# Patient Record
Sex: MALE | Race: WHITE | NOT HISPANIC OR LATINO | Employment: UNEMPLOYED | URBAN - METROPOLITAN AREA
[De-identification: names, ages, dates, MRNs, and addresses within clinical notes are randomized per-mention and may not be internally consistent; named-entity substitution may affect disease eponyms.]

---

## 2017-10-18 ENCOUNTER — OFFICE VISIT (OUTPATIENT)
Dept: MEDICAL GROUP | Facility: LAB | Age: 32
End: 2017-10-18
Payer: COMMERCIAL

## 2017-10-18 ENCOUNTER — HOSPITAL ENCOUNTER (OUTPATIENT)
Dept: LAB | Facility: MEDICAL CENTER | Age: 32
End: 2017-10-18
Attending: INTERNAL MEDICINE
Payer: COMMERCIAL

## 2017-10-18 VITALS
WEIGHT: 200.6 LBS | HEIGHT: 73 IN | BODY MASS INDEX: 26.59 KG/M2 | OXYGEN SATURATION: 95 % | RESPIRATION RATE: 12 BRPM | TEMPERATURE: 98.2 F | SYSTOLIC BLOOD PRESSURE: 102 MMHG | HEART RATE: 74 BPM | DIASTOLIC BLOOD PRESSURE: 62 MMHG

## 2017-10-18 DIAGNOSIS — Z00.00 ANNUAL PHYSICAL EXAM: ICD-10-CM

## 2017-10-18 DIAGNOSIS — F29 PSYCHOTIC DISORDER WITH DELUSIONS (HCC): Primary | ICD-10-CM

## 2017-10-18 LAB
25(OH)D3 SERPL-MCNC: 31 NG/ML (ref 30–100)
ALBUMIN SERPL BCP-MCNC: 4.9 G/DL (ref 3.2–4.9)
ALBUMIN/GLOB SERPL: 1.5 G/DL
ALP SERPL-CCNC: 59 U/L (ref 30–99)
ALT SERPL-CCNC: 19 U/L (ref 2–50)
ANION GAP SERPL CALC-SCNC: 10 MMOL/L (ref 0–11.9)
AST SERPL-CCNC: 21 U/L (ref 12–45)
BASOPHILS # BLD AUTO: 0.7 % (ref 0–1.8)
BASOPHILS # BLD: 0.05 K/UL (ref 0–0.12)
BILIRUB SERPL-MCNC: 0.8 MG/DL (ref 0.1–1.5)
BUN SERPL-MCNC: 13 MG/DL (ref 8–22)
CALCIUM SERPL-MCNC: 10.2 MG/DL (ref 8.5–10.5)
CHLORIDE SERPL-SCNC: 103 MMOL/L (ref 96–112)
CHOLEST SERPL-MCNC: 169 MG/DL (ref 100–199)
CO2 SERPL-SCNC: 26 MMOL/L (ref 20–33)
CREAT SERPL-MCNC: 1 MG/DL (ref 0.5–1.4)
EOSINOPHIL # BLD AUTO: 0.11 K/UL (ref 0–0.51)
EOSINOPHIL NFR BLD: 1.5 % (ref 0–6.9)
ERYTHROCYTE [DISTWIDTH] IN BLOOD BY AUTOMATED COUNT: 39.4 FL (ref 35.9–50)
GFR SERPL CREATININE-BSD FRML MDRD: >60 ML/MIN/1.73 M 2
GLOBULIN SER CALC-MCNC: 3.2 G/DL (ref 1.9–3.5)
GLUCOSE SERPL-MCNC: 88 MG/DL (ref 65–99)
HCT VFR BLD AUTO: 45.9 % (ref 42–52)
HDLC SERPL-MCNC: 45 MG/DL
HGB BLD-MCNC: 15.8 G/DL (ref 14–18)
IMM GRANULOCYTES # BLD AUTO: 0.01 K/UL (ref 0–0.11)
IMM GRANULOCYTES NFR BLD AUTO: 0.1 % (ref 0–0.9)
LDLC SERPL CALC-MCNC: 104 MG/DL
LYMPHOCYTES # BLD AUTO: 2.33 K/UL (ref 1–4.8)
LYMPHOCYTES NFR BLD: 31.7 % (ref 22–41)
MCH RBC QN AUTO: 29.8 PG (ref 27–33)
MCHC RBC AUTO-ENTMCNC: 34.4 G/DL (ref 33.7–35.3)
MCV RBC AUTO: 86.4 FL (ref 81.4–97.8)
MONOCYTES # BLD AUTO: 0.49 K/UL (ref 0–0.85)
MONOCYTES NFR BLD AUTO: 6.7 % (ref 0–13.4)
NEUTROPHILS # BLD AUTO: 4.35 K/UL (ref 1.82–7.42)
NEUTROPHILS NFR BLD: 59.3 % (ref 44–72)
NRBC # BLD AUTO: 0 K/UL
NRBC BLD AUTO-RTO: 0 /100 WBC
PLATELET # BLD AUTO: 231 K/UL (ref 164–446)
PMV BLD AUTO: 11.6 FL (ref 9–12.9)
POTASSIUM SERPL-SCNC: 4.1 MMOL/L (ref 3.6–5.5)
PROT SERPL-MCNC: 8.1 G/DL (ref 6–8.2)
RBC # BLD AUTO: 5.31 M/UL (ref 4.7–6.1)
SODIUM SERPL-SCNC: 139 MMOL/L (ref 135–145)
TRIGL SERPL-MCNC: 101 MG/DL (ref 0–149)
WBC # BLD AUTO: 7.3 K/UL (ref 4.8–10.8)

## 2017-10-18 PROCEDURE — 80053 COMPREHEN METABOLIC PANEL: CPT

## 2017-10-18 PROCEDURE — 80061 LIPID PANEL: CPT

## 2017-10-18 PROCEDURE — 82306 VITAMIN D 25 HYDROXY: CPT

## 2017-10-18 PROCEDURE — 36415 COLL VENOUS BLD VENIPUNCTURE: CPT

## 2017-10-18 PROCEDURE — 85025 COMPLETE CBC W/AUTO DIFF WBC: CPT

## 2017-10-18 PROCEDURE — 99204 OFFICE O/P NEW MOD 45 MIN: CPT | Performed by: INTERNAL MEDICINE

## 2017-10-18 RX ORDER — ZOLPIDEM TARTRATE 10 MG/1
10 TABLET ORAL NIGHTLY PRN
Qty: 10 TAB | Refills: 0 | Status: SHIPPED | OUTPATIENT
Start: 2017-10-18 | End: 2017-10-28

## 2017-10-18 ASSESSMENT — PATIENT HEALTH QUESTIONNAIRE - PHQ9
SUM OF ALL RESPONSES TO PHQ QUESTIONS 1-9: 19
CLINICAL INTERPRETATION OF PHQ2 SCORE: 2
5. POOR APPETITE OR OVEREATING: 2 - MORE THAN HALF THE DAYS

## 2017-10-18 NOTE — ASSESSMENT & PLAN NOTE
Patient does not have a primary care provider in the past. He does not have any major medical problems that he knows of. He does have a history of hypothyroidism running in his family.  I will go ahead and order some basic labs for him today including CBC, CMP, vitamin D, lipid panel, and TSH.  He declined all immunizations at this time. Will discuss more in his follow-up visit in 4 weeks.
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
## 2017-10-18 NOTE — PATIENT INSTRUCTIONS
Psychosis  Psychosis refers to a severe loss of contact with reality. During a psychotic episode, a person is not able to think clearly, and his or her emotions and responses do not match up with what is actually happening. Someone may have false beliefs about what is happening or who they are (delusions). Someone may see, hear, taste, smell, or feel things that are not present (hallucinations).   Psychosis usually occurs with very serious mental health (psychiatric) conditions such as schizophrenia, bipolar disorder, or major depression. It can sometimes also be the result of drug use or certain medical conditions.  SYMPTOMS  Symptoms of a psychotic episode include:  · Delusions, such as:  ¨ Feeling excessive fear or suspicion (paranoia).  ¨ Believing something that is odd, unrealistic, or false, such as having a false belief about being someone else.  · Hallucinations.  · Disorganized thinking, such as thoughts that jump from one to another that do not make sense to others.  · Disorganized speech, such as saying things that do not make sense to others.  · Inappropriate behavior, such as talking to oneself or intruding on unfamiliar people.  DIAGNOSIS  A diagnosis of psychosis is made through an assessment by a health care provider, who will ask questions about thoughts, feelings, behavior, drug use, and medical conditions. The health care provider may also do one or more of the following:  · Physical exam.  · Blood tests.  · Brain imaging, such as a CT scan or MRI.  · Brain wave study (EEG).  The health care provider may make a referral for further evaluation by a mental health professional.  TREATMENT   Treatment depends on the cause of the psychosis. Treatment may include one or more of the following:  · Monitoring and supportive care in the emergency room or hospital.    · Taking medicines (antipsychotic medicine) to reduce symptoms and to balance chemicals in the brain.  · Treating an underlying medical  condition.  · Stopping or reducing drugs that are causing psychosis.  · Therapy and other supportive programs outside of the hospital.  HOME CARE INSTRUCTIONS  · Over-the-counter and prescription medicines should be taken only as told by the health care provider.  · The health care provider should be consulted before over-the-counter medicines, herbs, or supplements are used.  · All follow-up visits should be kept as told by the health care provider. This is important.  · A healthy lifestyle should be maintained. This includes:  ¨ Eating a healthy diet.  ¨ Getting enough sleep.  ¨ Exercising regularly.  ¨ Avoiding alcohol and recreational drugs as told by the health care provider.  SEEK MEDICAL CARE IF:  · Medicines do not seem to be helping.  · The person hears voices telling him or her to do things.  · The person continues to see, smell, or feel things that are not there.  · The person feels extremely fearful and suspicious that someone or something will harm him or her.  · The person feels unable to leave his or her house.  · The person has trouble taking care of himself or herself.  · The person experiences side effects of medicines, such as:  ¨ Changes in sleep patterns.  ¨ Dizziness.  ¨ Weight gain.  ¨ Restlessness.  ¨ Movement changes.  ¨ Muscle spasms.  ¨ Tremors.  SEEK IMMEDIATE MEDICAL CARE IF:  · Serious thoughts occur about self-harm or about hurting others.  · There are serious side effects of medicine, such as:  ¨ Swelling of the face, lips, tongue, or throat.  ¨ Fever, confusion, muscle spasms, or seizures.     This information is not intended to replace advice given to you by your health care provider. Make sure you discuss any questions you have with your health care provider.     Document Released: 06/07/2011 Document Revised: 05/03/2016 Document Reviewed: 12/22/2015  [employer] Interactive Patient Education ©2016 [employer]
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
## 2017-10-20 ENCOUNTER — TELEPHONE (OUTPATIENT)
Dept: MEDICAL GROUP | Facility: LAB | Age: 32
End: 2017-10-20

## 2017-10-20 NOTE — TELEPHONE ENCOUNTER
Barbara called - she said the very first form of the [place] paperwork needed to be signed by you was missed. She is going to fax the forms over to us, if you can please sign as soon as possible. Barbara will provide a number to fax the forms to.

## 2017-10-20 NOTE — TELEPHONE ENCOUNTER
"----- Message from Katie Ramos M.D. sent at 10/20/2017  9:04 AM PDT -----  Denita Rodriguez,  I reviewed the results and your blood count, chemistry, vitamin D, liver and kidney function all within normal limits.  Your LDL \"Bad cholesterol\" is slightly above target. I will recommend that you adopt a healthy life style with healthy diet and regular exercise once everything settle down with your acute psychotic episode and I will be re-checking it about 6 months later.  I did fill out your FMLA form on Thursday and left it upfront by the end of the day.  You or your wife could stop by to pick it up anytime during business hours.     Thank you,  Katie Ramos M.D.  "

## 2017-10-20 NOTE — TELEPHONE ENCOUNTER
Patient's spouse was notified of lab results. Barbara wanted to know her 's Thyroid results - no results at this time. It looks like the patient was never drawn for TSH WITH REFLEX TO FT4 . I called the [id number] to see if they still have the patient's blood and if they can still do the TSH that was ordered? They said the patient didn't present at the time of service the the TSH order, it is too late to add the test on the patient will need to be re-drawn.

## 2017-10-20 NOTE — TELEPHONE ENCOUNTER
I spoke to Barbara that the forms are ready for . I let her know that a few of the fields were left blank for her to fill in. She will  the forms today.

## 2017-10-23 ENCOUNTER — TELEPHONE (OUTPATIENT)
Dept: MEDICAL GROUP | Facility: LAB | Age: 32
End: 2017-10-23

## 2017-10-23 ENCOUNTER — HOSPITAL ENCOUNTER (OUTPATIENT)
Dept: LAB | Facility: MEDICAL CENTER | Age: 32
End: 2017-10-23
Attending: INTERNAL MEDICINE
Payer: COMMERCIAL

## 2017-10-23 LAB — TSH SERPL DL<=0.005 MIU/L-ACNC: 1.07 UIU/ML (ref 0.3–3.7)

## 2017-10-23 PROCEDURE — 84443 ASSAY THYROID STIM HORMONE: CPT

## 2017-10-23 PROCEDURE — 36415 COLL VENOUS BLD VENIPUNCTURE: CPT

## 2017-10-23 NOTE — TELEPHONE ENCOUNTER
Patient's wife spoke with my MA today that she would like him to be referred to neurology thinking that his most recent psych episode is related to a neuro disorder.  Patient has had a pure psychotic breakout, his CT head from the other hospital was completely normal. He had no seizure. His full neuro exam was normal.   I don't think he will require a neurology referral at this time.  I prefer that patient meet with psychiatry and behavioral health first and I will hold off on neuro referral for now.

## 2017-10-25 NOTE — TELEPHONE ENCOUNTER
Patient notified no Neurology referral was submitted. Patient is ok with this at this time. Will follow up with behavioral health first. Thyroid results were also given to patient.

## 2017-10-26 DIAGNOSIS — F29 PSYCHOTIC DISORDER WITH DELUSIONS (HCC): ICD-10-CM

## 2017-10-26 NOTE — TELEPHONE ENCOUNTER
Was the patient seen in the last year in this department? Yes     Does patient have an active prescription for medications requested? No     Received Request Via: Patient     Last visit:10/18/17    Last  fill:10/19/17    Patient has an appointment Tuesday with a psychiatrist but patient will run out of Ambien before hand, patient would like a refill till he is seen. Patient would also like to try Ambien CR as he wakes up around 3 AM and can not fall back asleep.

## 2017-10-30 ENCOUNTER — TELEPHONE (OUTPATIENT)
Dept: MEDICAL GROUP | Facility: LAB | Age: 32
End: 2017-10-30

## 2017-10-30 RX ORDER — ZOLPIDEM TARTRATE 10 MG/1
10 TABLET ORAL NIGHTLY PRN
Qty: 10 TAB | Refills: 0
Start: 2017-10-30 | End: 2017-11-09

## 2017-10-30 NOTE — TELEPHONE ENCOUNTER
Liseth, please call patient to see if he got to see psych and behavioral yet since the referral was urgent.  Please let him know that Ambien can't be filled electronically. We need to get psych to see him soon otherwise I need to see him in the office for [employer] and a controlled substance contract before we can give him more benzo.

## 2017-10-30 NOTE — TELEPHONE ENCOUNTER
I spoke to Barbara (pt's spouse) he has his first psych appointment tomorrow. She thinks he should be able to get his ambien refilled at his appointment tomorrow. If not, she knows he will need an appointment with Dr. Ramos and to do a urine drug test here in the office.

## 2017-11-10 ENCOUNTER — TELEPHONE (OUTPATIENT)
Dept: MEDICAL GROUP | Facility: LAB | Age: 32
End: 2017-11-10

## 2017-11-10 NOTE — TELEPHONE ENCOUNTER
ESTABLISHED PATIENT PRE-VISIT PLANNING     Note: Patient will not be contacted if there is no indication to call.     1.  Reviewed notes from the last few office visits within the medical group: Yes    2.  If any orders were placed at last visit or intended to be done for this visit (i.e. 6 mos follow-up), do we have Results/Consult Notes?        •  Labs - Labs ordered, completed on 10/23/17 and results are in chart.   Note: If patient appointment is for lab review and patient did not complete labs, check with provider if OK to reschedule patient until labs completed.       •  Imaging - Imaging was not ordered at last office visit.       •  Referrals - No referrals were ordered at last office visit.    3. Is this appointment scheduled as a Hospital Follow-Up? No    4.  Immunizations were updated in [employer] using [employer]?: Yes       •  Web [employer] Recommendations: FLU    5.  Patient is due for the following Health Maintenance Topics:   Health Maintenance Due   Topic Date Due   • IMM INFLUENZA (1) 09/01/2017           6.  Patient was NOT informed to arrive 15 min prior to their scheduled appointment and bring in their medication bottles.

## 2017-11-13 ENCOUNTER — OFFICE VISIT (OUTPATIENT)
Dept: MEDICAL GROUP | Facility: LAB | Age: 32
End: 2017-11-13
Payer: COMMERCIAL

## 2017-11-13 VITALS
WEIGHT: 207.8 LBS | TEMPERATURE: 97 F | HEART RATE: 80 BPM | HEIGHT: 73 IN | OXYGEN SATURATION: 98 % | BODY MASS INDEX: 27.54 KG/M2 | SYSTOLIC BLOOD PRESSURE: 116 MMHG | RESPIRATION RATE: 16 BRPM | DIASTOLIC BLOOD PRESSURE: 82 MMHG

## 2017-11-13 DIAGNOSIS — F99 INSOMNIA DUE TO OTHER MENTAL DISORDER: ICD-10-CM

## 2017-11-13 DIAGNOSIS — Z23 NEED FOR VACCINATION: ICD-10-CM

## 2017-11-13 DIAGNOSIS — F29 PSYCHOTIC DISORDER WITH DELUSIONS (HCC): ICD-10-CM

## 2017-11-13 DIAGNOSIS — F51.05 INSOMNIA DUE TO OTHER MENTAL DISORDER: ICD-10-CM

## 2017-11-13 PROCEDURE — 99214 OFFICE O/P EST MOD 30 MIN: CPT | Mod: 25 | Performed by: INTERNAL MEDICINE

## 2017-11-13 PROCEDURE — 90686 IIV4 VACC NO PRSV 0.5 ML IM: CPT | Performed by: INTERNAL MEDICINE

## 2017-11-13 PROCEDURE — 90471 IMMUNIZATION ADMIN: CPT | Performed by: INTERNAL MEDICINE

## 2017-11-13 RX ORDER — ZOLPIDEM TARTRATE 12.5 MG/1
12.5 TABLET, FILM COATED, EXTENDED RELEASE ORAL NIGHTLY PRN
Qty: 15 TAB | Refills: 1 | Status: SHIPPED | OUTPATIENT
Start: 2017-11-13 | End: 2017-12-13

## 2017-11-13 RX ORDER — QUETIAPINE FUMARATE 25 MG/1
25 TABLET, FILM COATED ORAL
COMMUNITY
End: 2020-11-06

## 2017-11-13 NOTE — ASSESSMENT & PLAN NOTE
This is a chronic controlled problem.   Patient is started to have difficulty falling asleep and staying asleep since his acute psychotic episode about 2 months ago. He was using some Ambien that I prescribed him last time about 3 times a week. He stated that, it does help him with the sleep and he would like to get a refill.  He was able to meet with a psychologist for counseling sessions but unfortunately he stated that his insurance does not cover psychiatric.  We discussed that I will continue to prescribe him Ambien 12.5 mg slow release for #15 pills per month and will slowly taper him off.  We'll do a [employer] screen today.

## 2017-11-13 NOTE — PROGRESS NOTES
Chief Complaint   Patient presents with   • Anxiety     follow up       Subjective:     HPI:   Michael presents today to follow-up on his brief psychotic episode.    Psychotic disorder with delusions  This is a chronic controlled problem.   Patient is here for follow-up today for his acute psychotic episode that he had about 2 months ago during vacation.  He continued to be on Seroquel 25 mg daily. He is established with a psychologist with [facility] about couple of weeks ago. So far, he had a couple of counseling sessions with great help.   Today, patient stated that he is feeling 100% better. He has no further episodes of disorientation, delusions, mood swings or hallucinations. 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  Denied any suicidal ideations.  His going back to work on Monday.      Insomnia due to other mental disorder  This is a chronic controlled problem.   Patient is started to have difficulty falling asleep and staying asleep since his acute psychotic episode about 2 months ago. He was using some Ambien that I prescribed him last time about 3 times a week. He stated that, it does help him with the sleep and he would like to get a refill.  He was able to meet with a psychologist for counseling sessions but unfortunately he stated that his insurance does not cover psychiatric.  We discussed that I will continue to prescribe him Ambien 12.5 mg slow release for #15 pills per month and will slowly taper him off.  We'll do a [facility] screen today.    Need for vaccination  Discussed the pros and cons of getting a flu Shot. Patient Finally Agreed to Proceed with Influenza Vaccination Today.      Patient Active Problem List    Diagnosis Date Noted   • Insomnia due to other mental disorder 11/13/2017   • Need for vaccination 11/13/2017   • Psychotic disorder with delusions 10/18/2017   • Annual  physical exam 10/18/2017       Current Outpatient Prescriptions   Medication Sig Dispense Refill   • zolpidem (AMBIEN CR) 12.5 MG CR tablet Take 1 Tab by mouth at bedtime as needed for Sleep for up to 30 days. 15 Tab 1   • quetiapine (SEROQUEL) 25 MG Tab Take 25 mg by mouth every bedtime.       No current facility-administered medications for this visit.        Allergies as of 11/13/2017 - Reviewed 10/18/2017   Allergen Reaction Noted   • Prednisone Rash 06/13/2011   • Prednisone  03/16/2013        Past Medical History:   Diagnosis Date   • Bipolar affect, depressed (CMS-HCC)    • Insomnia due to other mental disorder 11/13/2017       History reviewed. No pertinent surgical history.    Social History   Substance Use Topics   • Smoking status: Never Smoker   • Smokeless tobacco: Never Used   • Alcohol use 1.8 oz/week     3 Shots of liquor per week      Comment: occ       Family History   Problem Relation Age of Onset   • No Known Problems Mother    • No Known Problems Brother    • Prostate cancer Paternal Grandfather    • Thyroid Paternal Uncle        ROS:     - Constitutional: Negative for fever, chills, unexpected weight change, and fatigue/generalized weakness.     - HEENT: Negative for headaches, vision changes, hearing changes, ear pain, ear discharge, sinus congestion, or sore throat.     - Respiratory: Negative for cough, sputum production, dyspnea and wheezing.    - Cardiovascular: Negative for chest pain or palpitations.      - Gastrointestinal: Negative for heartburn, nausea, vomiting, abdominal pain, diarrhea or constipation.     - Genitourinary: Negative for dysuria, polyuria or urinary urgency.    - Musculoskeletal: Negative for myalgias, back pain, and joint pain.     - Skin: Negative for rash, itching, cyanotic skin color change.     - Psychiatric/Behavioral: Negative for depression or suicidal/homicidal ideation.       Physical Exam:     Blood pressure 116/82, pulse 80, temperature 36.1 °C (97 °F),  "resp. rate 16, height 1.854 m (6' 1\"), weight 94.3 kg (207 lb 12.8 oz), SpO2 98 %. Body mass index is 27.42 kg/m².   Gen:         Alert and oriented, No apparent distress.  Neck:        No Lymphadenopathy or Bruits.  Lungs:     Clear to auscultation bilaterally  CV:          Regular rate and rhythm. No murmurs, rubs or gallops.               Ext:          No clubbing, cyanosis, edema.       Assessment and Plan:     32 y.o. male with the following issues.    1. Psychotic disorder with delusions  This is a chronic controlled problem.   Has been on Seroquel 25 mg daily at bedtime. Since patient only had any acute psychotic episodes about 10 years ago and again 2 months ago, I think their frequency of his flareups are not enough to keep him on a long-term maintenance therapy. I will aim to completely wean him off Seroquel as soon as is feasible.  Suggested that he start taking half a pill of Seroquel 25 mg every night or take one pill every other night for now.  I will see him again in one month to reevaluate him and consider stopping medications. Continue to see psychology for counseling sessions.    2. Insomnia due to other mental disorder  This is a chronic uncontrolled problem.   This problem has been triggered by his recent psychotic episode. He is still requiring sleep aids couple of times a week. I will prescribe him Ambien as below for #15 pills for the month. Will reevaluate next visit.  We'll get a [facility] screen as well.  - zolpidem (AMBIEN CR) 12.5 MG CR tablet; Take 1 Tab by mouth at bedtime as needed for Sleep for up to 30 days.  Dispense: 15 Tab; Refill: 1 [facility] SCREEN; Future    3. Need for vaccination  Urine today.  - INFLUENZA VACCINE QUAD INJ >3Y(PF)           Health Maintenance:   Completed    Health Maintenance Due   Topic   • IMM INFLUENZA (1)Given today.        Follow Up:      Return in about 4 weeks (around 12/11/2017) for FU Psych meds. .      Please note that this " dictation was created using voice recognition software. I have made every reasonable attempt to correct obvious errors, but I expect that there are errors of grammar and possibly content that I did not discover before finalizing the note.    Signed by: Katie Ramos M.D.

## 2017-11-13 NOTE — ASSESSMENT & PLAN NOTE
This is a chronic controlled problem.   Patient is here for follow-up today for his acute psychotic episode that he had about 2 months ago during vacation.  He continued to be on Seroquel 25 mg daily. He is established with a psychologist with [facility] about couple of weeks ago. So far, he had a couple of counseling sessions with great help.   Today, patient stated that he is feeling 100% better. He has no further episodes of disorientation, delusions, mood swings or hallucinations. 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  Denied any suicidal ideations.  His going back to work on Monday.

## 2017-11-13 NOTE — LETTER
November 13, 2017    To Whom It May Concern:         This is confirmation that Michael Burdick attended his scheduled appointment with Katie Ramos M.D. on 11/13/17.       He can resume his normal work duties and schedule.          If you have any questions please do not hesitate to call me at the phone number listed below.    Sincerely,          Katie Ramos M.D.  [phone]

## 2017-11-13 NOTE — ASSESSMENT & PLAN NOTE
Discussed the pros and cons of getting a flu Shot. Patient Finally Agreed to Proceed with Influenza Vaccination Today.

## 2019-10-08 ENCOUNTER — IMMUNIZATION (OUTPATIENT)
Dept: SOCIAL WORK | Facility: CLINIC | Age: 34
End: 2019-10-08

## 2019-10-08 DIAGNOSIS — Z23 NEED FOR VACCINATION: ICD-10-CM

## 2019-10-08 PROCEDURE — 90686 IIV4 VACC NO PRSV 0.5 ML IM: CPT | Performed by: REGISTERED NURSE

## 2020-11-05 ENCOUNTER — TELEMEDICINE (OUTPATIENT)
Dept: MEDICAL GROUP | Facility: PHYSICIAN GROUP | Age: 35
End: 2020-11-05
Payer: COMMERCIAL

## 2020-11-05 VITALS — TEMPERATURE: 102 F | WEIGHT: 194 LBS | HEIGHT: 74 IN | BODY MASS INDEX: 24.9 KG/M2

## 2020-11-05 DIAGNOSIS — R52 BODY ACHES: ICD-10-CM

## 2020-11-05 DIAGNOSIS — R50.81 FEVER IN OTHER DISEASES: ICD-10-CM

## 2020-11-05 DIAGNOSIS — R05.9 COUGH: ICD-10-CM

## 2020-11-05 DIAGNOSIS — R06.02 SHORTNESS OF BREATH: ICD-10-CM

## 2020-11-05 PROCEDURE — 99214 OFFICE O/P EST MOD 30 MIN: CPT | Performed by: NURSE PRACTITIONER

## 2020-11-05 RX ORDER — AMOXICILLIN AND CLAVULANATE POTASSIUM 875; 125 MG/1; MG/1
1 TABLET, FILM COATED ORAL 2 TIMES DAILY
Qty: 10 TAB | Refills: 0 | Status: SHIPPED | OUTPATIENT
Start: 2020-11-05 | End: 2021-10-01

## 2020-11-05 NOTE — PROGRESS NOTES
Virtual Visit: Established Patient   This visit was conducted via Zoom using secure and encrypted videoconferencing technology. The patient was in a private location in the state of [place].    The patient's identity was confirmed and verbal consent was obtained for this virtual visit.    Subjective:   CC:   Chief Complaint   Patient presents with   • Shortness of Breath       Michael Thissen is a 35 y.o. male presenting for evaluation and management of:    Cough  Patient symptoms started on Sunday with cough and body aches  He also had one episode of vomiting  Concern for Covid and would like testing, this has been ordered  He also complains of sinus pressure and pain, cough previously productive of clear phlegm and is now attending dark yellow and green  Discussed differentials to include seasonal allergies versus acute sinusitis versus bronchitis versus Covid versus other unknown upper respiratory infection  We will empirically treat for sinusitis as symptoms are consistent  He was advised to continue with self-isolation, avoid working until he can get the results of his Covid test  ER precautions should his symptoms become severe        ROS   Positive for SOB,     Allergies   Allergen Reactions   • Prednisone Rash   • Prednisone        Current medicines (including changes today)  Current Outpatient Medications   Medication Sig Dispense Refill   • amoxicillin-clavulanate (AUGMENTIN) 875-125 MG Tab Take 1 Tab by mouth 2 times a day. 10 Tab 0   • quetiapine (SEROQUEL) 25 MG Tab Take 25 mg by mouth every bedtime.       No current facility-administered medications for this visit.        Patient Active Problem List    Diagnosis Date Noted   • Fever 11/06/2020   • Body aches 11/06/2020   • Cough 11/06/2020   • Shortness of breath 11/06/2020   • Insomnia due to other mental disorder 11/13/2017   • Need for vaccination 11/13/2017   • Psychotic disorder with delusions (HCC) 10/18/2017   • Annual physical exam 10/18/2017  "      Family History   Problem Relation Age of Onset   • No Known Problems Mother    • No Known Problems Brother    • Prostate cancer Paternal Grandfather    • Thyroid Paternal Uncle        He  has a past medical history of Bipolar affect, depressed (CMS-HCC) (HCC) and Insomnia due to other mental disorder (11/13/2017).  He  has no past surgical history on file.       Objective:   Temp (!) 38.9 °C (102 °F) (Oral)   Ht 1.88 m (6' 2\")   Wt 88 kg (194 lb)   BMI 24.91 kg/m²     Physical Exam:  Constitutional: Alert, no distress, well-groomed.  Skin: No rashes in visible areas.  Eye: Round. Conjunctiva clear, lids normal. No icterus.   ENMT: Lips pink without lesions, good dentition, moist mucous membranes. Phonation normal.  Neck: No masses, no thyromegaly. Moves freely without pain.  Respiratory: Unlabored respiratory effort, no cough or audible wheeze  Psych: Alert and oriented x3, normal affect and mood.       Assessment and Plan:   The following treatment plan was discussed:     1. Fever in other diseases  - COVID/SARS COV-2 PCR; Future    2. Body aches  - COVID/SARS COV-2 PCR; Future    3. Cough  - COVID/SARS COV-2 PCR; Future    4. Shortness of breath  - COVID/SARS COV-2 PCR; Future    Other orders  - amoxicillin-clavulanate (AUGMENTIN) 875-125 MG Tab; Take 1 Tab by mouth 2 times a day.  Dispense: 10 Tab; Refill: 0        Follow-up: No follow-ups on file.         "

## 2020-11-06 ENCOUNTER — HOSPITAL ENCOUNTER (OUTPATIENT)
Dept: LAB | Facility: MEDICAL CENTER | Age: 35
End: 2020-11-06
Attending: NURSE PRACTITIONER
Payer: COMMERCIAL

## 2020-11-06 PROBLEM — R05.9 COUGH: Status: ACTIVE | Noted: 2020-11-06

## 2020-11-06 PROBLEM — R52 BODY ACHES: Status: ACTIVE | Noted: 2020-11-06

## 2020-11-06 PROBLEM — R06.02 SHORTNESS OF BREATH: Status: ACTIVE | Noted: 2020-11-06

## 2020-11-06 PROBLEM — R50.9 FEVER: Status: ACTIVE | Noted: 2020-11-06

## 2020-11-06 LAB — COVID ORDER STATUS COVID19: NORMAL

## 2020-11-06 PROCEDURE — C9803 HOPD COVID-19 SPEC COLLECT: HCPCS

## 2020-11-06 PROCEDURE — U0003 INFECTIOUS AGENT DETECTION BY NUCLEIC ACID (DNA OR RNA); SEVERE ACUTE RESPIRATORY SYNDROME CORONAVIRUS 2 (SARS-COV-2) (CORONAVIRUS DISEASE [COVID-19]), AMPLIFIED PROBE TECHNIQUE, MAKING USE OF HIGH THROUGHPUT TECHNOLOGIES AS DESCRIBED BY CMS-2020-01-R: HCPCS

## 2020-11-06 NOTE — ASSESSMENT & PLAN NOTE
Patient symptoms started on Sunday with cough and body aches  He also had one episode of vomiting  Concern for Covid and would like testing, this has been ordered  He also complains of sinus pressure and pain, cough previously productive of clear phlegm and is now attending dark yellow and green  Discussed differentials to include seasonal allergies versus acute sinusitis versus bronchitis versus Covid versus other unknown upper respiratory infection  We will empirically treat for sinusitis as symptoms are consistent  He was advised to continue with self-isolation, avoid working until he can get the results of his Covid test  ER precautions should his symptoms become severe

## 2020-11-07 LAB
SARS-COV-2 RNA RESP QL NAA+PROBE: NOTDETECTED
SPECIMEN SOURCE: NORMAL

## 2021-10-01 ENCOUNTER — OFFICE VISIT (OUTPATIENT)
Dept: MEDICAL GROUP | Facility: MEDICAL CENTER | Age: 36
End: 2021-10-01
Payer: COMMERCIAL

## 2021-10-01 VITALS
WEIGHT: 201.5 LBS | BODY MASS INDEX: 25.86 KG/M2 | TEMPERATURE: 97.6 F | OXYGEN SATURATION: 99 % | DIASTOLIC BLOOD PRESSURE: 62 MMHG | HEIGHT: 74 IN | HEART RATE: 68 BPM | SYSTOLIC BLOOD PRESSURE: 102 MMHG

## 2021-10-01 DIAGNOSIS — B02.9 HERPES ZOSTER WITHOUT COMPLICATION: ICD-10-CM

## 2021-10-01 PROBLEM — F99 INSOMNIA DUE TO OTHER MENTAL DISORDER: Status: RESOLVED | Noted: 2017-11-13 | Resolved: 2021-10-01

## 2021-10-01 PROBLEM — R05.9 COUGH: Status: RESOLVED | Noted: 2020-11-06 | Resolved: 2021-10-01

## 2021-10-01 PROBLEM — Z00.00 ANNUAL PHYSICAL EXAM: Status: RESOLVED | Noted: 2017-10-18 | Resolved: 2021-10-01

## 2021-10-01 PROBLEM — Z23 NEED FOR VACCINATION: Status: RESOLVED | Noted: 2017-11-13 | Resolved: 2021-10-01

## 2021-10-01 PROBLEM — R50.9 FEVER: Status: RESOLVED | Noted: 2020-11-06 | Resolved: 2021-10-01

## 2021-10-01 PROBLEM — F51.05 INSOMNIA DUE TO OTHER MENTAL DISORDER: Status: RESOLVED | Noted: 2017-11-13 | Resolved: 2021-10-01

## 2021-10-01 PROBLEM — R06.02 SHORTNESS OF BREATH: Status: RESOLVED | Noted: 2020-11-06 | Resolved: 2021-10-01

## 2021-10-01 PROBLEM — F29 PSYCHOTIC DISORDER WITH DELUSIONS (HCC): Status: RESOLVED | Noted: 2017-10-18 | Resolved: 2021-10-01

## 2021-10-01 PROBLEM — R52 BODY ACHES: Status: RESOLVED | Noted: 2020-11-06 | Resolved: 2021-10-01

## 2021-10-01 PROCEDURE — 99214 OFFICE O/P EST MOD 30 MIN: CPT | Performed by: PHYSICIAN ASSISTANT

## 2021-10-01 RX ORDER — VALACYCLOVIR HYDROCHLORIDE 1 G/1
TABLET, FILM COATED ORAL
COMMUNITY
Start: 2021-09-30 | End: 2022-04-04

## 2021-10-01 NOTE — ASSESSMENT & PLAN NOTE
This is a pleasant 36-year-old male here today to establish care.  Last week was having back pain on the left side.  On Monday he developed a rash on his back.  Also down the left leg.  Teledoc visit yesterday confirmed shingles.  Complains of discomfort.  Was prescribed Valtrex the other day.  Has a 5-month at home and is concerned about transmission risk.  Is relatively healthy.  Currently on no medications.

## 2021-10-01 NOTE — PROGRESS NOTES
"Subjective:   Michael uBrdick is a 36 y.o. male here today for shingles.    Herpes zoster without complication  This is a pleasant 36-year-old male here today to establish care.  Last week was having back pain on the left side.  On Monday he developed a rash on his back.  Also down the left leg.  Teledoc visit yesterday confirmed shingles.  Complains of discomfort.  Was prescribed Valtrex the other day.  Has a 5-month at home and is concerned about transmission risk.  Is relatively healthy.  Currently on no medications.       Current medicines (including changes today)  Current Outpatient Medications   Medication Sig Dispense Refill   • valacyclovir (VALTREX) 1 GM Tab        No current facility-administered medications for this visit.     He  has a past medical history of Bipolar affect, depressed (HCC) and Insomnia due to other mental disorder (11/13/2017).    Social History and Family History were reviewed and updated.    ROS   No chest pain, no shortness of breath, no abdominal pain and all other systems were reviewed and are negative.       Objective:     /62   Pulse 68   Temp 36.4 °C (97.6 °F) (Temporal)   Ht 1.88 m (6' 2\")   Wt 91.4 kg (201 lb 8 oz)   SpO2 99%  Body mass index is 25.87 kg/m².   Physical Exam:  Constitutional: Alert, no distress.  Skin: Warm, dry, good turgor.  On the left side of his lumbar spine as well as down the left leg there is a line of erythematous vesicles.  No discharge or scabs noted.  Eye: Equal, round and reactive, conjunctiva clear, lids normal.  ENMT: Lips without lesions, good dentition, oropharynx clear.  Neck: Trachea midline, no masses.   Lymph: No cervical or supraclavicular lymphadenopathy  Respiratory: Unlabored respiratory effort, lungs appear clear, no wheezes.  Cardiovascular: Regular rate and rhythm.  Psych: Alert and oriented x3, normal affect and mood.        Assessment and Plan:   The following treatment plan was discussed    1. Herpes zoster without " complication  Acute, new onset condition.  Advised to reduce stress.  No concerns today.  May finish Valtrex but advised typically medication should be provided within 72 hours of rash developing.  Discussed possibility of postherpetic neuralgia.  May contact me through my chart with any further concerns.  Advised when the lesions are active and draining that is when they are most contagious.  Discussed Shingrix vaccination when 48 years of age.        Followup: Return in 6 months (on 4/1/2022), or if symptoms worsen or fail to improve.    Please note that this dictation was created using voice recognition software. I have made every reasonable attempt to correct obvious errors, but I expect that there are errors of grammar and possibly content that I did not discover before finalizing the note.

## 2022-04-03 ENCOUNTER — HOSPITAL ENCOUNTER (EMERGENCY)
Facility: MEDICAL CENTER | Age: 37
End: 2022-04-03
Attending: EMERGENCY MEDICINE
Payer: COMMERCIAL

## 2022-04-03 VITALS
BODY MASS INDEX: 40.43 KG/M2 | HEIGHT: 74 IN | TEMPERATURE: 97.6 F | HEART RATE: 90 BPM | RESPIRATION RATE: 20 BRPM | DIASTOLIC BLOOD PRESSURE: 63 MMHG | OXYGEN SATURATION: 95 % | SYSTOLIC BLOOD PRESSURE: 105 MMHG | WEIGHT: 315 LBS

## 2022-04-03 DIAGNOSIS — F30.9 MANIC EPISODE (HCC): ICD-10-CM

## 2022-04-03 DIAGNOSIS — G47.00 INSOMNIA, UNSPECIFIED TYPE: ICD-10-CM

## 2022-04-03 LAB
ALBUMIN SERPL BCP-MCNC: 4.9 G/DL (ref 3.2–4.9)
ALBUMIN/GLOB SERPL: 1.5 G/DL
ALP SERPL-CCNC: 72 U/L (ref 30–99)
ALT SERPL-CCNC: 34 U/L (ref 2–50)
AMMONIA PLAS-SCNC: 19 UMOL/L (ref 11–45)
AMPHET UR QL SCN: NEGATIVE
ANION GAP SERPL CALC-SCNC: 16 MMOL/L (ref 7–16)
AST SERPL-CCNC: 39 U/L (ref 12–45)
BARBITURATES UR QL SCN: NEGATIVE
BASOPHILS # BLD AUTO: 0.5 % (ref 0–1.8)
BASOPHILS # BLD: 0.04 K/UL (ref 0–0.12)
BENZODIAZ UR QL SCN: NEGATIVE
BILIRUB SERPL-MCNC: 0.8 MG/DL (ref 0.1–1.5)
BUN SERPL-MCNC: 14 MG/DL (ref 8–22)
BZE UR QL SCN: NEGATIVE
CALCIUM SERPL-MCNC: 9.9 MG/DL (ref 8.5–10.5)
CANNABINOIDS UR QL SCN: POSITIVE
CHLORIDE SERPL-SCNC: 105 MMOL/L (ref 96–112)
CO2 SERPL-SCNC: 21 MMOL/L (ref 20–33)
CREAT SERPL-MCNC: 1.19 MG/DL (ref 0.5–1.4)
EOSINOPHIL # BLD AUTO: 0.03 K/UL (ref 0–0.51)
EOSINOPHIL NFR BLD: 0.4 % (ref 0–6.9)
ERYTHROCYTE [DISTWIDTH] IN BLOOD BY AUTOMATED COUNT: 39.4 FL (ref 35.9–50)
ETHANOL BLD-MCNC: <10.1 MG/DL (ref 0–10)
GFR SERPLBLD CREATININE-BSD FMLA CKD-EPI: 80 ML/MIN/1.73 M 2
GLOBULIN SER CALC-MCNC: 3.2 G/DL (ref 1.9–3.5)
GLUCOSE SERPL-MCNC: 107 MG/DL (ref 65–99)
HCT VFR BLD AUTO: 48.1 % (ref 42–52)
HGB BLD-MCNC: 16.5 G/DL (ref 14–18)
IMM GRANULOCYTES # BLD AUTO: 0.03 K/UL (ref 0–0.11)
IMM GRANULOCYTES NFR BLD AUTO: 0.4 % (ref 0–0.9)
LYMPHOCYTES # BLD AUTO: 2.07 K/UL (ref 1–4.8)
LYMPHOCYTES NFR BLD: 25.4 % (ref 22–41)
MCH RBC QN AUTO: 29.1 PG (ref 27–33)
MCHC RBC AUTO-ENTMCNC: 34.3 G/DL (ref 33.7–35.3)
MCV RBC AUTO: 84.8 FL (ref 81.4–97.8)
METHADONE UR QL SCN: NEGATIVE
MONOCYTES # BLD AUTO: 0.68 K/UL (ref 0–0.85)
MONOCYTES NFR BLD AUTO: 8.3 % (ref 0–13.4)
NEUTROPHILS # BLD AUTO: 5.3 K/UL (ref 1.82–7.42)
NEUTROPHILS NFR BLD: 65 % (ref 44–72)
NRBC # BLD AUTO: 0 K/UL
NRBC BLD-RTO: 0 /100 WBC
OPIATES UR QL SCN: NEGATIVE
OXYCODONE UR QL SCN: NEGATIVE
PCP UR QL SCN: NEGATIVE
PLATELET # BLD AUTO: 211 K/UL (ref 164–446)
PMV BLD AUTO: 10.1 FL (ref 9–12.9)
POC BREATHALIZER: 0 PERCENT (ref 0–0.01)
POTASSIUM SERPL-SCNC: 3.8 MMOL/L (ref 3.6–5.5)
PROPOXYPH UR QL SCN: NEGATIVE
PROT SERPL-MCNC: 8.1 G/DL (ref 6–8.2)
RBC # BLD AUTO: 5.67 M/UL (ref 4.7–6.1)
SODIUM SERPL-SCNC: 142 MMOL/L (ref 135–145)
T PALLIDUM AB SER QL IA: NORMAL
WBC # BLD AUTO: 8.2 K/UL (ref 4.8–10.8)

## 2022-04-03 PROCEDURE — 82077 ASSAY SPEC XCP UR&BREATH IA: CPT

## 2022-04-03 PROCEDURE — 302970 POC BREATHALIZER: Performed by: EMERGENCY MEDICINE

## 2022-04-03 PROCEDURE — 86780 TREPONEMA PALLIDUM: CPT

## 2022-04-03 PROCEDURE — 99284 EMERGENCY DEPT VISIT MOD MDM: CPT

## 2022-04-03 PROCEDURE — 85025 COMPLETE CBC W/AUTO DIFF WBC: CPT

## 2022-04-03 PROCEDURE — 700102 HCHG RX REV CODE 250 W/ 637 OVERRIDE(OP): Performed by: EMERGENCY MEDICINE

## 2022-04-03 PROCEDURE — 36415 COLL VENOUS BLD VENIPUNCTURE: CPT

## 2022-04-03 PROCEDURE — 80053 COMPREHEN METABOLIC PANEL: CPT

## 2022-04-03 PROCEDURE — 80307 DRUG TEST PRSMV CHEM ANLYZR: CPT

## 2022-04-03 PROCEDURE — A9270 NON-COVERED ITEM OR SERVICE: HCPCS | Performed by: EMERGENCY MEDICINE

## 2022-04-03 PROCEDURE — 82140 ASSAY OF AMMONIA: CPT

## 2022-04-03 RX ORDER — DIPHENHYDRAMINE HCL 25 MG
50 TABLET ORAL ONCE
Status: COMPLETED | OUTPATIENT
Start: 2022-04-03 | End: 2022-04-03

## 2022-04-03 RX ORDER — LORAZEPAM 2 MG/1
2 TABLET ORAL ONCE
Status: COMPLETED | OUTPATIENT
Start: 2022-04-03 | End: 2022-04-03

## 2022-04-03 RX ORDER — QUETIAPINE FUMARATE 25 MG/1
50 TABLET, FILM COATED ORAL ONCE
Status: COMPLETED | OUTPATIENT
Start: 2022-04-03 | End: 2022-04-03

## 2022-04-03 RX ADMIN — QUETIAPINE FUMARATE 50 MG: 25 TABLET ORAL at 20:58

## 2022-04-03 RX ADMIN — LORAZEPAM 2 MG: 2 TABLET ORAL at 20:58

## 2022-04-03 RX ADMIN — DIPHENHYDRAMINE HYDROCHLORIDE 50 MG: 25 TABLET ORAL at 20:58

## 2022-04-04 ENCOUNTER — APPOINTMENT (OUTPATIENT)
Dept: MEDICAL GROUP | Facility: MEDICAL CENTER | Age: 37
End: 2022-04-04
Payer: COMMERCIAL

## 2022-04-04 ENCOUNTER — OFFICE VISIT (OUTPATIENT)
Dept: MEDICAL GROUP | Facility: MEDICAL CENTER | Age: 37
End: 2022-04-04
Payer: COMMERCIAL

## 2022-04-04 ENCOUNTER — HOSPITAL ENCOUNTER (EMERGENCY)
Facility: MEDICAL CENTER | Age: 37
End: 2022-04-06
Attending: EMERGENCY MEDICINE
Payer: COMMERCIAL

## 2022-04-04 VITALS
SYSTOLIC BLOOD PRESSURE: 133 MMHG | HEART RATE: 108 BPM | BODY MASS INDEX: 25.46 KG/M2 | HEIGHT: 74 IN | WEIGHT: 198.41 LBS | TEMPERATURE: 98.1 F | RESPIRATION RATE: 20 BRPM | OXYGEN SATURATION: 98 % | DIASTOLIC BLOOD PRESSURE: 94 MMHG

## 2022-04-04 DIAGNOSIS — F23 ACUTE PSYCHOSIS (HCC): ICD-10-CM

## 2022-04-04 DIAGNOSIS — F30.10 MANIC BEHAVIOR (HCC): ICD-10-CM

## 2022-04-04 DIAGNOSIS — F23 BRIEF PSYCHOTIC DISORDER (HCC): ICD-10-CM

## 2022-04-04 PROBLEM — Z09 HOSPITAL DISCHARGE FOLLOW-UP: Status: ACTIVE | Noted: 2022-04-04

## 2022-04-04 LAB
AMPHET UR QL SCN: NEGATIVE
BARBITURATES UR QL SCN: NEGATIVE
BENZODIAZ UR QL SCN: NEGATIVE
BZE UR QL SCN: NEGATIVE
CANNABINOIDS UR QL SCN: POSITIVE
METHADONE UR QL SCN: NEGATIVE
OPIATES UR QL SCN: NEGATIVE
OXYCODONE UR QL SCN: NEGATIVE
PCP UR QL SCN: NEGATIVE
POC BREATHALIZER: 0 PERCENT (ref 0–0.01)
PROPOXYPH UR QL SCN: NEGATIVE

## 2022-04-04 PROCEDURE — 700111 HCHG RX REV CODE 636 W/ 250 OVERRIDE (IP): Performed by: EMERGENCY MEDICINE

## 2022-04-04 PROCEDURE — A9270 NON-COVERED ITEM OR SERVICE: HCPCS | Performed by: EMERGENCY MEDICINE

## 2022-04-04 PROCEDURE — 99285 EMERGENCY DEPT VISIT HI MDM: CPT

## 2022-04-04 PROCEDURE — 80307 DRUG TEST PRSMV CHEM ANLYZR: CPT

## 2022-04-04 PROCEDURE — 302970 POC BREATHALIZER: Performed by: EMERGENCY MEDICINE

## 2022-04-04 PROCEDURE — 99214 OFFICE O/P EST MOD 30 MIN: CPT | Performed by: INTERNAL MEDICINE

## 2022-04-04 PROCEDURE — 700102 HCHG RX REV CODE 250 W/ 637 OVERRIDE(OP): Performed by: EMERGENCY MEDICINE

## 2022-04-04 PROCEDURE — 96372 THER/PROPH/DIAG INJ SC/IM: CPT

## 2022-04-04 RX ORDER — QUETIAPINE FUMARATE 25 MG/1
25 TABLET, FILM COATED ORAL NIGHTLY
Status: DISCONTINUED | OUTPATIENT
Start: 2022-04-04 | End: 2022-04-06 | Stop reason: HOSPADM

## 2022-04-04 RX ORDER — HALOPERIDOL 5 MG/ML
5 INJECTION INTRAMUSCULAR ONCE
Status: COMPLETED | OUTPATIENT
Start: 2022-04-04 | End: 2022-04-04

## 2022-04-04 RX ORDER — HALOPERIDOL 5 MG/ML
INJECTION INTRAMUSCULAR
Status: DISPENSED
Start: 2022-04-04 | End: 2022-04-05

## 2022-04-04 RX ORDER — QUETIAPINE FUMARATE 25 MG/1
25 TABLET, FILM COATED ORAL NIGHTLY
Qty: 90 TABLET | Refills: 0 | Status: SHIPPED | OUTPATIENT
Start: 2022-04-04

## 2022-04-04 RX ORDER — LORAZEPAM 2 MG/ML
2 INJECTION INTRAMUSCULAR ONCE
Status: COMPLETED | OUTPATIENT
Start: 2022-04-04 | End: 2022-04-04

## 2022-04-04 RX ADMIN — LORAZEPAM 2 MG: 2 INJECTION INTRAMUSCULAR; INTRAVENOUS at 14:05

## 2022-04-04 RX ADMIN — QUETIAPINE FUMARATE 25 MG: 25 TABLET ORAL at 22:16

## 2022-04-04 RX ADMIN — HALOPERIDOL LACTATE 5 MG: 5 INJECTION, SOLUTION INTRAMUSCULAR at 14:05

## 2022-04-04 ASSESSMENT — ENCOUNTER SYMPTOMS
COUGH: 0
SORE THROAT: 0
VOMITING: 0
FEVER: 0
SHORTNESS OF BREATH: 0
CHILLS: 0
NERVOUS/ANXIOUS: 1
INSOMNIA: 1
ABDOMINAL PAIN: 0
NAUSEA: 0

## 2022-04-04 ASSESSMENT — FIBROSIS 4 INDEX
FIB4 SCORE: 1.24

## 2022-04-04 ASSESSMENT — LIFESTYLE VARIABLES: SUBSTANCE_ABUSE: 1

## 2022-04-04 NOTE — ED NOTES
Patient's home medications have been reviewed by the pharmacy team.     Past Medical History:   Diagnosis Date   • Bipolar affect, depressed (HCC)    • Insomnia due to other mental disorder 11/13/2017       Patient's Medications   New Prescriptions    No medications on file   Previous Medications    QUETIAPINE (SEROQUEL) 25 MG TAB    Take 1 Tablet by mouth every evening.   Modified Medications    No medications on file   Discontinued Medications    VALACYCLOVIR (VALTREX) 1 GM TAB            Medications do not appear to be contributing to current complaints.   Home medications have been reordered as appropriate.    Elizabet Alamo, PharmD

## 2022-04-04 NOTE — ED NOTES
Per wife, pt is still not sleeping but laying quietly in bed at this time. Wife also stated that pt will occasionally cry and will verbalized wanting to go home. Wife able to de-escalate pt with verbal command. No signs of acute distress noted. Equal chest rise and fall. No further needs at this time. Call light within reach. Will continue to monitor pt.

## 2022-04-04 NOTE — ED PROVIDER NOTES
ED Provider Note    CHIEF COMPLAINT  Chief Complaint   Patient presents with   • Psych Eval   • Agitation     X 2 hours       HPI  Michael Burdick is a 37 y.o. male who presents to the emergency department with his wife for concern for manic episode.  Patient does have a history of bipolar affect depression and insomnia he used to be on Seroquel but has not been on any medications for several years.  The wife states that 5 years ago after they traveled back from [place] he had a lack of sleep which led to manic episodes abnormal speech and who was evaluated for psychosis.  She states that they have had a rough week they have a 2-year-old 6-month-old at home and her  has not been sleeping and so the last few days he has been agitated anxious tearful speaking rapidly and just not quite himself.    They tried hydralazine over-the-counter and he got a little drowsy and slept for short period of time but he kind of popped back up pretty quickly and last night the patient's wife gave him some Ambien and states he slept for about 45 minutes and then woke back up she really has not any any consistent sleep.  He did not wish to hurt himself or others    REVIEW OF SYSTEMS  Positives as above. Pertinent negatives include nausea vomiting fevers chills cough congestion chest pain shortness of breath homicidal or suicidal ideation auditory or visual hallucinations  All other review of systems are negative    PAST MEDICAL HISTORY   has a past medical history of Bipolar affect, depressed (HCC) and Insomnia due to other mental disorder (11/13/2017).    SOCIAL HISTORY  Social History     Tobacco Use   • Smoking status: Never Smoker   • Smokeless tobacco: Never Used   Vaping Use   • Vaping Use: Never used   Substance and Sexual Activity   • Alcohol use: Yes     Alcohol/week: 1.8 oz     Types: 3 Shots of liquor per week     Comment: occasionally    • Drug use: Not Currently     Frequency: 0.1 times per week     Types:  "Marijuana     Comment: recreationally   • Sexual activity: Yes     Partners: Female     Birth control/protection: Pill     Comment: , 2 young children       SURGICAL HISTORY  patient denies any surgical history    CURRENT MEDICATIONS  Home Medications    **Home medications have not yet been reviewed for this encounter**         ALLERGIES  Allergies   Allergen Reactions   • Prednisone Rash   • Prednisone    • Risperidone Anxiety       PHYSICAL EXAM  VITAL SIGNS: BP (!) 181/103   Pulse (!) 103   Temp 36.3 °C (97.4 °F) (Oral)   Resp 20   Ht 1.88 m (6' 2\")   Wt (!) 155 kg (341 lb 14.9 oz)   SpO2 98%   BMI 43.90 kg/m²    Pulse ox interpretation: I interpret this pulse ox as normal.  Constitutional: Alert in no apparent distress.  Currently resting comfortably  HENT: Normocephalic atraumatic, MMM  Eyes: PER, Conjunctiva normal, Non-icteric.   Neck: Normal range of motion, No tenderness, Supple, No stridor.   Cardiovascular: Regular rate and rhythm, no murmurs.   Thorax & Lungs: Normal breath sounds, No respiratory distress, No wheezing, No chest tenderness.   Abdomen: Bowel sounds normal, Soft, No tenderness, No pulsatile masses. No peritoneal signs.  Skin: Warm, Dry, No erythema, No rash.   Extremities/MSK: Intact equal distal pulses, No edema, No tenderness, No cyanosis, no major deformities noted  Neurologic: Alert and oriented x3, No focal deficits noted.   Psychiatric: Flat affect he does not have any rapid or pressured speech at this time not responding to internal stimuli denies SI or HI      DIFFERENTIAL DIAGNOSIS AND WORK UP PLAN    This is a 37 y.o. male who presents with history of psychosis present a very long time since he is had any episodes likely is all secondary to recent insomnia due to stressful week at home.  The patient really did not actually speak that much me mostly his wife did but when he did speak he was calm and cooperative he does appear sleepy this is likely secondary to the " "Atarax taking effect, the patient be given some oral medications laboratory now is just to ensure were not missing a medical cause of his acute change and otherwise likely discharge home as he does not meet criteria for hold.    DIAGNOSTIC STUDIES / PROCEDURES    EKG  No results found for this or any previous visit.    LABS  Pertinent Lab Findings    Labs Reviewed   COMP METABOLIC PANEL - Abnormal; Notable for the following components:       Result Value    Glucose 107 (*)     All other components within normal limits   URINE DRUG SCREEN - Abnormal; Notable for the following components:    Cannabinoid Metab Positive (*)     All other components within normal limits   POC BREATHALIZER - Normal   CBC WITH DIFFERENTIAL   DIAGNOSTIC ALCOHOL   AMMONIA   T.PALLIDUM AB EIA   ESTIMATED GFR       RADIOLOGY  No orders to display     The radiologist's interpretation of all radiological studies have been reviewed by me.      COURSE & MEDICAL DECISION MAKING  Pertinent Labs & Imaging studies reviewed. (See chart for details)    I reassessed patient at bedside he has been able to get a little bit of sleep urine drug screen is only positive cannabinoids otherwise laboratory analysis was unremarkable and this is likely secondary to underlying psychosis and insomnia.  Patient's wife states he has follow-up tomorrow morning at 7 AM with primary care who was the person who originally saw him and started him on the low-dose Seroquel.  He was able to get some sleep.  Hopefully will get some more sleep once he gets home they were given strict return precautions for worsening change in his symptoms or thoughts of hurting himself or others.  They understand and feel comfortable going home    /63   Pulse 90   Temp 36.4 °C (97.6 °F) (Temporal)   Resp 20   Ht 1.88 m (6' 2\")   Wt (!) 155 kg (341 lb 14.9 oz)   SpO2 95%   BMI 43.90 kg/m²       I verified that the patient was wearing a mask and I was wearing appropriate PPE every " time I entered the room. The patient's mask was on the patient at all times during my encounter except for a brief view of the oropharynx.    The patient will return for new or worsening symptoms and is stable at the time of discharge.    The patient is referred to a primary physician for blood pressure management, diabetic screening, and for all other preventative health concerns.    DISPOSITION:  Patient will be discharged home in stable condition.    FOLLOW UP:  Richmond Adler, P.A.-C.  [address]  [address]  [facility] [address]  [phone]    Go on 4/4/2022      [facility], Emergency Dept  [address] [facility] [address]  [phone]    If symptoms worsen      OUTPATIENT MEDICATIONS:  Discharge Medication List as of 4/3/2022 11:23 PM              FINAL IMPRESSION  1. Manic episode (HCC)     2. Insomnia, unspecified type             Electronically signed by: Iris Metcalf M.D., 4/3/2022 8:29 PM    This dictation has been created using voice recognition software and/or scribes. The accuracy of the dictation is limited by the abilities of the software and the expertise of the scribes. I expect there may be some errors of grammar and possibly content. I made every attempt to manually correct the errors within my dictation. However, errors related to voice recognition software and/or scribes may still exist and should be interpreted within the appropriate context.

## 2022-04-04 NOTE — ED PROVIDER NOTES
ED Provider Note    CHIEF COMPLAINT  Chief Complaint   Patient presents with   • Anxiety     Severe Seen here yesterday for same  Pt totally out of control Hyperventilating Screaming Literally running around triage dragging his wife  Security called CN aware  Unable to de escalate pt       HPI  Michael Burdick is a 37 y.o. male who presents with anxiety and agitation.  The patient was evaluated at [facility] yesterday for suspected acute psychosis from insomnia.  The wife states about 5 years ago he had a similar episode after the patient did not sleep for a prolonged period of time.  Recently the patient has not slept for the last 5 days.  The patient was sedated yesterday in the emergency department discharged for outpatient follow-up.  I saw primary care provider today that was not effective.  The patient is very agitated and anxious.  He will not answer questions appropriately.  Therefore most of the history is from his wife.  The patient had a drug screen yesterday there is only positive for marijuana.  Wife is unaware of any recent infections.    REVIEW OF SYSTEMS  See HPI for further details. All other systems are negative.     PAST MEDICAL HISTORY  Past Medical History:   Diagnosis Date   • Insomnia due to other mental disorder 11/13/2017   • Bipolar affect, depressed (HCC)        FAMILY HISTORY  Negative for psychosis    SOCIAL HISTORY  Social History     Socioeconomic History   • Marital status:    Tobacco Use   • Smoking status: Never Smoker   • Smokeless tobacco: Never Used   Vaping Use   • Vaping Use: Never used   Substance and Sexual Activity   • Alcohol use: Yes     Alcohol/week: 1.8 oz     Types: 3 Shots of liquor per week     Comment: occasionally    • Drug use: Not Currently     Frequency: 0.1 times per week     Types: Marijuana     Comment: recreationally   • Sexual activity: Yes     Partners: Female     Birth control/protection: Pill     Comment: , 2 young children  "  Social History Narrative    ** Merged History Encounter **            SURGICAL HISTORY  No past surgical history on file.    CURRENT MEDICATIONS  Home Medications    **Home medications have not yet been reviewed for this encounter**         ALLERGIES  Allergies   Allergen Reactions   • Prednisone Rash   • Prednisone    • Risperidone Anxiety       PHYSICAL EXAM  VITAL SIGNS: Ht 1.88 m (6' 2\")   Wt 90 kg (198 lb 6.6 oz)   BMI 25.47 kg/m²       Constitutional: Agitated and anxious  HENT: Normocephalic, Atraumatic, Bilateral external ears normal, Oropharynx moist, No oral exudates, Nose normal.   Eyes: PERRLA, EOMI, Conjunctiva normal, No discharge.   Neck: Normal range of motion, No tenderness, Supple, No stridor.   Lymphatic: No lymphadenopathy noted.   Cardiovascular: Normal heart rate, Normal rhythm, No murmurs, No rubs, No gallops.   Thorax & Lungs: Normal breath sounds, No respiratory distress, No wheezing, No chest tenderness.   Abdomen: Bowel sounds normal, Soft, No tenderness, No masses, No pulsatile masses.   Skin: Warm, Dry, No erythema, No rash.   Back: No tenderness, No CVA tenderness.   Extremities: Intact distal pulses, No edema, No tenderness, No cyanosis, No clubbing.   Neurologic: Alert & oriented x 3, Normal motor function, Normal sensory function, No focal deficits noted.   Psychiatric: Agitated.     COURSE & MEDICAL DECISION MAKING  Pertinent Labs & Imaging studies reviewed. (See chart for details)  This a 37-year-old male who presents to the emergency department acutely psychotic.  This could be from insomnia.  I did review his work-up from yesterday with no clear organic nor toxic source.  The patient clearly cannot care for himself.  He did require sedation with Haldol and Ativan for the safety of himself as well as those around him.  I will place that on a legal hold and life skills will be notified.    The patient will be admitted to ED observation on 4/4/2022 at 1443 as we await evaluation " by life skills and placement for acute psychosis.    FINAL IMPRESSION  1.  Acute psychosis  2.  Insomnia    Disposition  The patient will be transferred for further psychiatric care.         Electronically signed by: Milton Torres M.D., 4/4/2022 1:53 PM

## 2022-04-04 NOTE — ED NOTES
Sitter is outside of room in direct line of sight of patient for 1:1 observation.  Safety precautions in place.

## 2022-04-04 NOTE — ED NOTES
Pt assisted to BR w/ ED Tech to change into gown and provide urine sample.  Room cleared of all equipment and belongings.

## 2022-04-04 NOTE — ED TRIAGE NOTES
"  Chief Complaint   Patient presents with   • Psych Eval   • Agitation     X 2 hours     Pt to triage for above complaint. Pt BIB wife for agitation and confusion. Pt's wife states pt has not slept in 4 days due to a new baby at home. Pt is A&Ox2 and states \"I forgot everything, I just don't know.\" Pt appears agitated, extremely anxious, crying on and off and pacing in triage. Wife states pt has Hx of similar episode when he has not slept but denies other psych Hx. Pt denies SI/HI, drug or alcohol use. Charge RN aware, pt roomed.      .BP (!) 181/103   Pulse (!) 103   Temp 36.3 °C (97.4 °F) (Oral)   Resp 20   Ht 1.88 m (6' 2\")   Wt (!) 155 kg (341 lb 14.9 oz)   SpO2 98%   BMI 43.90 kg/m²     "

## 2022-04-04 NOTE — ED TRIAGE NOTES
Pt flailing arms and darting about A danger to staff and other patients Straight back to treatment room

## 2022-04-04 NOTE — ED NOTES
Pt is highly agitated, hyperventilating and pacing around room. Pt is not receptive conversation or deescalation attempts at this time. Security at door.

## 2022-04-04 NOTE — ED NOTES
"Pt suddenly sat down and apologized for his behavior, he still appears distressed however is able to answer questions appropriately.   After discussion with wife outside of patients room. Prior to going to ER last night wife walked into bathroom and found pt in a manic state and holding a cord up near his head, stating he \"couldn't do this anymore.\" When asked if she felt safe taking him home upon discharge she states \"honestly I do not feel safe for him, myself or my babies.\"  "

## 2022-04-04 NOTE — ED NOTES
Pt cleared for discharge by ERP.    Pt discharged in stable condition. Discharge instructions given and explained to pt and wife; verbalized understanding. Pt being discharged home with wife.

## 2022-04-05 PROCEDURE — 700102 HCHG RX REV CODE 250 W/ 637 OVERRIDE(OP): Performed by: EMERGENCY MEDICINE

## 2022-04-05 PROCEDURE — A9270 NON-COVERED ITEM OR SERVICE: HCPCS | Performed by: EMERGENCY MEDICINE

## 2022-04-05 RX ORDER — OLANZAPINE 5 MG/1
5 TABLET, ORALLY DISINTEGRATING ORAL EVERY EVENING
Status: DISCONTINUED | OUTPATIENT
Start: 2022-04-05 | End: 2022-04-05

## 2022-04-05 RX ORDER — OLANZAPINE 5 MG/1
5 TABLET, ORALLY DISINTEGRATING ORAL EVERY EVENING
Status: DISCONTINUED | OUTPATIENT
Start: 2022-04-05 | End: 2022-04-06 | Stop reason: HOSPADM

## 2022-04-05 RX ADMIN — QUETIAPINE FUMARATE 25 MG: 25 TABLET ORAL at 20:54

## 2022-04-05 NOTE — DISCHARGE PLANNING
[facility] ED Behavioral Health Fax Referral      [facility] ED Behavioral Health Alert Team:  [phone]    Referral: Legal Hold    Intervention: Patient referral to [facility] inpatient  facillity    Legal Hold Initiated: Date: 4/5/22 Time: 0201    Patient’s Insurance Listed on Face Sheet: [facility]    Referrals sent to: [facility], [facility]    Referrals faxed by JEWEL Hathaway    This referral contains the following information:  1) Face sheet _x___  2) Page 1 and Page 2 of Legal Hold __x_  3) Alert Team Assessment/Psych Assessment _x___  4) Head to toe physical exam ___x_  5) Urine Drug Screen _x___  6) Blood Alcohol __x__  7) Vital signs __x__  8) Pregnancy test when applicable ___  9) Medications list __x__  10) Covid screening ____    Plan: Patient will transfer to mental health facility once acceptance is obtained

## 2022-04-05 NOTE — DISCHARGE PLANNING
Alert Team/Behavioral Health Note:    Shahla from [facility] Intake called stating that patient is not covered under spouse's insurance plan and only spouse is covered under insurance plan.    Faxed insurance verification in  that verifies patient has active insurance.

## 2022-04-05 NOTE — ED NOTES
"Updated Pt wife per Pt request; Pt stated \"I'm good with the plan\", denies any needs, no distress noted;  "

## 2022-04-05 NOTE — CONSULTS
"  [facility] INPATIENT PSYCHIATRIC CONSULT LIAISON NOTE: INTAKE     DOS: 04/05/2022    Consulting Physician: Dr. Romario Espinoza.    REASON FOR CONSULT:  Legal Hold Evaluation    CC: “I came in for an appointment yesterday and it was an interesting appointment.”     HPI:  From Chart Review “Patient is a 38 y/o male BIB EMS from PCP for inability to care for self d/t acute psychosis. Patient was seen at [facility] yesterday for similar presentation and discharged given Seroquel medication to address patient's altered mental status triggered by sleep deprivation and stress. Collateral information obtained from patient's wife reports exact manic type episodes occurred in 2008 then 2017 while patient and wife were on their honeymoon in [place]; patient was unable to sleep for 3-4 days d/t increasing stress. Patient was diagnosed with \"manic episodes\" and prescribed Seroquel and Benadryl back in 2017 until issue resolved not requiring continuation of medications. Wife reports probable triggers for current episode are the birth of a new baby in the past 6 months, the recent passing of an extended family member and the loss of patient's job in the past month. Patient has not slept since Thursday with increasingly erratic behavior, anxiety, labile mood and paranoia.”    Patient was seen this morning as an initially consult via Tele visit. Reports “Freaking out” yesterday at his doctor’s office when he thought his “history was not on the screen” States “I panicked” Reports similar episode in 2008 and 2017. Repots a history of “bipolar” States he did not sleep over the weekend “From Friday through Sunday, because my mother stressed me out, I had a manic episode.” Eventually slept on Sunday with the help of Seroquel.  Reports recent stressor as loosing his job. Had a good interview on Friday.  Has a 2-year-old and a 7-month-old, it is difficult to sleep well at night. Was brought to the ED by his wife. He " "has good support. Discussed starting risks and benefits of Olanzapine. Patient is willing to try this. Discussed importance of medication compliance.  Denies SI/HI/AVH. Feels Depressed and anxious due to his current financial situation and stress from his mother.        ALLERGIES:       Allergies   Allergen Reactions   • Prednisone Rash   • Prednisone     • Risperidone Anxiety          PAST MEDICAL HISTORY:   Unremarkable     PAST PSYCHIATRIC HISTORY:        Diagnosis Date   • Insomnia due to other mental disorder 11/13/2017   • Bipolar affect, depressed (HCC)             LEGAL HISTORY:  Denies     SOCIAL HISTORY:   Patient resides with wife and 2 children ages 3 and 6 months of age. Patient is presently unemployed; reports good social support           Socioeconomic History   • Marital status:    Tobacco Use   • Smoking status: Never Smoker   • Smokeless tobacco: Never Used   Vaping Use   • Vaping Use: Never used   Substance and Sexual Activity   • Alcohol use: Yes       Alcohol/week: 1.8 oz       Types: 3 Shots of liquor per week       Comment: occasionally    • Drug use: Not Currently       Frequency: 0.1 times per week       Types: Marijuana       Comment: recreationally   • Sexual activity: Yes       Partners: Female       Birth control/protection: Pill       Comment: , 2 young children   Social History Narrative     ** Merged History Encounter **             CURRENT HOSPITAL PROBLEMS:       NONE     PSYCHIATRIC EXAMNIATION:  VITALS: WNL     MENTAL STATUS EXAM:  Appearance: Dressed in hospital gown, normal grooming and hygiene, no acute distress.   Attitude: Calm and cooperative.  Behavior: Sitting up in bed. Negative for psychomotor agitation  Musculoskeletal: Unremarkable  Eye Contact: Adequate.  Speech:  coherent, adequate volume  Affect: Normal  Mood: \"good\"  Process: Goal-directed, organized. Linear  Content: Negative for Suicidal and Homicidal ideations.  Perception: Negative for auditory " and visual hallucinations  Orientation: Oriented to Time, Place, Person and Self.  Memory: No significant deficits elicited  Insight: Fair     LABS: Reviewed     CURRENT PSYCHIATRIC MEDICATIONS:   Seroquel 25mg at bedtime     ASSESSMENT:    Bipolar Disorder  Major Depressive Disorder recurrent with anxious distress    PLAN:  Legal Hold: On Hold    Recommend:  Continue Seroquel 25mg P.O at bedtime.  Start Olanzapine 5mg P.O daily for mood.     -Psych CL will continue following patient while at [facility].  -Medication reconciliation was completed.  -Reviewed safety plan - 911, ER, PCM, MHC, Suicide Crisis Line, Nursing staff while    inpatient.  -Visitors: Yes  -Personal Belongings: Yes  -Observation Level: Telesitter   -Instruction: Please Transfer to inpatient psych when medically cleared.   Discussed with Dr. Andreea Soto

## 2022-04-05 NOTE — ED NOTES
Bedside report given to Rodolfo.  Sitter is outside of room in direct line of sight of patient for 1:1 observation.  Safety precautions in place.

## 2022-04-05 NOTE — ED NOTES
Received report from JEWEL Fragoso.  Sitter is outside of room in direct line of sight of patient for 1:1 observation.  Safety precautions in place.

## 2022-04-05 NOTE — DISCHARGE PLANNING
Alert Team/Behavioral Health Note:    Called RB and talked to Yas in Intake. Left written message for Shahla in Intake to call back.

## 2022-04-05 NOTE — ED NOTES
"Pt requested to \"hold\" 2100 dose of Seroquel till after Alert Team eval;    Pt denies any needs, no distress noted;  "

## 2022-04-05 NOTE — DISCHARGE PLANNING
Resent insurance information to [facility] as they were having trouble verifying the patient's coverage.

## 2022-04-05 NOTE — ED NOTES
1:1 direct observation bedside; safety precautions in place; Pt laying in gurney, no distress noted;    Bedside report received from JEWEL Lockwood;

## 2022-04-05 NOTE — ED NOTES
1:1 direct observation bedside; safety precautions in place; Pt laying in gurney, no distress noted;    ERP [place] approved Pt to have another pillow and family member to be bedside as well;

## 2022-04-05 NOTE — PROGRESS NOTES
"Subjective:     Diagnoses of Manic behavior (HCC) and Acute psychosis (HCC) were pertinent to this visit.      HPI: Michael is a pleasant 37 y.o. male who presents today with his wife Barbara     Problem   Acute Psychosis (Hcc)    This is an established patient of Richmond Adler, who presents today for emergency room follow-up.  Its extremely difficult to get history from the patient, he does not answer questions straight, most of his questions are answered by his wife.  Since Thursday he was not able to sleep.  It is unclear how many hours he was able to sleep every night.  Per patient's wife he had an episode, that they define his manic episode on Saturday after a sip of whiskey.  They say patient \" has felt completely out of reality, being emotional and crying\" it is unclear if patient has been hallucinating or having other psychotic symptoms.  Patient has been using marijuana daily denies any other illicit drug use.  Back in 2017 patient was diagnosed with manic episode and was prescribed Seroquel, that he has not taken since few years?  Patient did go to emergency room yesterday, where he was given Seroquel and Benadryl.  Basic labs did not show any abnormalities.  Patient tested positive for marijuana on urine drug screen and was discharged home with outpatient follow-up with PCP at 7 AM today.  Patient has missed this appointment and it was rescheduled with me.    Unable to perform PHQ-9-as patient would not answer single questions straight.  Patient did admit that he had suicidal thoughts, however denied any suicidal plan.  It is extremely difficult to get any history from the patient, as he would not answer questions straight, he would defer all the answers to his wife.  I have advised wife and the patient, that I will place a stat referral for psychiatry evaluation and I will renew his prescription for Seroquel to take 25 mg tablet nightly.  They stated, that they wanted to establish care with me-and I have " "explained that this appointment is an acute visit and establishing care would have to be a separate visit.  They have asked if I was specializing in internal medicine -which I do, however I explained, that I am not a psychiatry specialist and he needs to be referred to a specialist.   At the end of the appointment patient asked if I knew a physician named Sumanth Suh?  And I have asked him to spell the name as I was not familiar with this physician.  Patient became extremely upset that I was not familiar with this physician.  I have explained that I have started working at [facility] recently and I am not familiar with all physicians in the network.  He said that \" it should all be in the records from 2008\".  I have informed the patient, that there are no encounters from 2008 and he became agitated, started repeating \"how can they change medical records!!!\",  \"Then I do not even know what we are talking about if they are able to change the records\" became very emotional, agitated and started hyperventilating.  I have notified patient and the wife, that I do not feel comfortable continuing with next appointment and I might need to call security as I do not feel safe.  I have advised patient and wife, that they should be evaluated in emergency room settings with psychiatric evaluation at bedside.   All attempts to  the patient and calm him down were unsuccessful.  Patient became even more agitated, started hyperventilating, became emotional and almost crying.  I have personally transported patient to emergency room of [facility] with his wife and checked him in to ER to  be further evaluated and treated for acute psychosis.         Past Medical History:   Diagnosis Date   • Bipolar affect, depressed (HCC)    • Insomnia due to other mental disorder 11/13/2017     History reviewed. No pertinent surgical history.  Family History   Problem Relation Age of Onset   • No Known Problems Mother    • No Known " "Problems Brother    • Prostate cancer Paternal Grandfather    • Thyroid Paternal Uncle      Social History     Tobacco Use   • Smoking status: Never Smoker   • Smokeless tobacco: Never Used   Vaping Use   • Vaping Use: Never used   Substance Use Topics   • Alcohol use: Yes     Alcohol/week: 1.8 oz     Types: 3 Shots of liquor per week     Comment: rarely   • Drug use: Not Currently     Frequency: 0.1 times per week     Types: Marijuana     Comment: occasional marijuana       Current Facility-Administered Medications Ordered in Epic   Medication Dose Route Frequency Provider Last Rate Last Admin   • HALOPERIDOL LACTATE 5 MG/ML INJ SOLN            • QUEtiapine (Seroquel) tablet 25 mg  25 mg Oral Nightly Milton Torres M.D.         Current Outpatient Medications Ordered in Epic   Medication Sig Dispense Refill   • QUEtiapine (SEROQUEL) 25 MG Tab Take 1 Tablet by mouth every evening. 90 Tablet 0       Health Maintenance: deferred to PCP    Review of Systems   Constitutional: Negative for chills, fever and malaise/fatigue.   HENT: Negative for sore throat.    Respiratory: Negative for cough and shortness of breath.    Cardiovascular: Negative for chest pain.   Gastrointestinal: Negative for abdominal pain, nausea and vomiting.   Psychiatric/Behavioral: Positive for substance abuse (Marijuana) and suicidal ideas (Denies plan). The patient is nervous/anxious and has insomnia.      Objective:     Exam:  /94 (BP Location: Right arm, Patient Position: Sitting, BP Cuff Size: Adult)   Pulse (!) 108   Temp 36.7 °C (98.1 °F) (Temporal)   Resp 20   Ht 1.88 m (6' 2\")   Wt 90 kg (198 lb 6.6 oz)   SpO2 98%   BMI 25.47 kg/m²  Body mass index is 25.47 kg/m².    Physical Exam  HENT:      Head: Normocephalic and atraumatic.      Nose: Nose normal. No congestion.      Mouth/Throat:      Mouth: Mucous membranes are moist.      Pharynx: Oropharynx is clear. No oropharyngeal exudate.   Eyes:      General: No scleral " icterus.     Pupils: Pupils are equal, round, and reactive to light.   Cardiovascular:      Rate and Rhythm: Normal rate and regular rhythm.      Pulses: Normal pulses.      Heart sounds: Normal heart sounds.   Pulmonary:      Effort: Pulmonary effort is normal. No respiratory distress.      Breath sounds: Normal breath sounds. No stridor. No wheezing.   Skin:     General: Skin is warm.   Neurological:      General: No focal deficit present.      Mental Status: He is alert.      Cranial Nerves: No cranial nerve deficit.      Sensory: No sensory deficit.      Motor: No weakness.      Gait: Gait normal.      Deep Tendon Reflexes: Reflexes normal.   Psychiatric:         Attention and Perception: He is attentive.         Mood and Affect: Mood is anxious. Affect is labile.         Speech: Speech is tangential.         Behavior: Behavior is agitated and hyperactive.         Thought Content: Thought content is paranoid. Thought content is not delusional. Thought content includes suicidal ideation. Thought content does not include homicidal ideation. Thought content does not include suicidal plan.         Judgment: Judgment is impulsive.       Labs: I have reviewed results of urine drug screen, CBC, CMP, ammonia levels.    Assessment & Plan:   Michael  is a pleasant 37 y.o. male with the following -     Problem List Items Addressed This Visit     Acute psychosis (HCC)     In my opinion, patient is having acute episode of psychosis.  I have personally transported patient in wheelchair to emergency room.   Please see HPI for further details.         Relevant Medications    QUEtiapine (SEROQUEL) 25 MG Tab        Return if symptoms worsen or fail to improve.  to follow-up with PCP    Please note that this dictation was created using voice recognition software. I have made every reasonable attempt to correct obvious errors, but I expect that there are errors of grammar and possibly content that I did not discover before finalizing  the note.

## 2022-04-05 NOTE — ED NOTES
Patient was presented for a telehealth consultation via secure and encrypted videoconferencing technology.

## 2022-04-05 NOTE — ED NOTES
"Pt provided w/ breakfast.  Pt denies SI/HI.  Pt states was able to sleep during the night and is \"feeling much better.\"    "

## 2022-04-05 NOTE — PROGRESS NOTES
"ED Provider Progress Note  ED Observation Progress Note    Date of Service: 04/05/22    Interval History  Patient is a 37-year-old male who came in for severe anxiety on 4/4/2022.  Anxiety was leading to inability to care for self.  Psychiatry was consulted and patient was placed on a legal hold.  He has had similar manic episodes in the past.  Psychiatry team evaluated the patient yesterday and today and patient was started on Seroquel yesterday and after they evaluated patient today he will also be started on Zyprexa.  This morning patient is comfortable and feeling well.  Having some mild anxiety but states that he was able to get rest last night.  At this time he is still pending inpatient psychiatric placement.    Physical Exam  /91   Pulse (!) 105   Temp 37.1 °C (98.7 °F) (Temporal)   Resp 18   Ht 1.88 m (6' 2\")   Wt 88.5 kg (195 lb)   SpO2 97%   BMI 25.04 kg/m² .    Constitutional: Awake and alert. Nontoxic  HENT:  Grossly normal  Eyes: Grossly normal  Neck: Normal range of motion  Cardiovascular: Normal heart rate   Thorax & Lungs: No respiratory distress  Skin:  No pathologic rash.   Extremities: Well perfused  Psychiatric: Affect normal    Labs  Results for orders placed or performed during the hospital encounter of 04/04/22   URINE DRUG SCREEN (TRIAGE)   Result Value Ref Range    Amphetamines Urine Negative Negative    Barbiturates Negative Negative    Benzodiazepines Negative Negative    Cocaine Metabolite Negative Negative    Methadone Negative Negative    Opiates Negative Negative    Oxycodone Negative Negative    Phencyclidine -Pcp Negative Negative    Propoxyphene Negative Negative    Cannabinoid Metab Positive (A) Negative   POC BREATHALIZER   Result Value Ref Range    POC Breathalizer 0.00 0.00 - 0.01 Percent       Radiology  No orders to display       Problem List  1.  Anxiety/manic episode-pending inpatient psychiatric placement on legal hold, continue Seroquel 25 mg nightly and " olanzapine 5 mg daily      Electronically signed by: Andreea Soto M.D., 4/5/2022 12:19 PM

## 2022-04-05 NOTE — ED NOTES
1:1 direct observation bedside; safety precautions in place;     Pt used restroom; denies any needs, no distress noted;

## 2022-04-05 NOTE — ASSESSMENT & PLAN NOTE
In my opinion, patient is having acute episode of psychosis.  I have personally transported patient in wheelchair to emergency room.   Please see HPI for further details.

## 2022-04-05 NOTE — ED NOTES
Pt spouse left for the night, Pt denies any needs, no distress noted;    Pt wife and Pt both requested to keep Pt wife included in the plan of care - ERP [place] notified;    Pt wife verbalized that she wants the Pt back home with the kids and her; wife stated that she does NOT fear for her kids and/or her life with the Pt around;

## 2022-04-05 NOTE — ED NOTES
Pt provided w/ water.  Sitter is outside of room in direct line of sight of patient for 1:1 observation.  Safety precautions in place.

## 2022-04-05 NOTE — ED NOTES
Pt ate breakfast.  Pt denies any needs at this time.  Pt resting quietly on gurney.  Sitter is outside of room in direct line of sight of patient for 1:1 observation.  Safety precautions in place.

## 2022-04-05 NOTE — ED NOTES
1:1 sitter at door. POC discussed with patient. Waiting for psych recommendation. Waiting for lunch tray. Pt provided ice water.

## 2022-04-06 VITALS
HEIGHT: 74 IN | TEMPERATURE: 98.2 F | DIASTOLIC BLOOD PRESSURE: 99 MMHG | RESPIRATION RATE: 18 BRPM | HEART RATE: 98 BPM | BODY MASS INDEX: 25.03 KG/M2 | OXYGEN SATURATION: 99 % | WEIGHT: 195 LBS | SYSTOLIC BLOOD PRESSURE: 159 MMHG

## 2022-04-06 NOTE — DISCHARGE PLANNING
Alert Team:    Faxed over new referral packet with updated insurance coverage to [facility].

## 2022-04-06 NOTE — ED NOTES
Pt wife stated his insurance number was incorrect and was the reason his insurance wasn't cover IP psych, updated member number is [id number] per wife. Note per Mag on alert team states new packet has been sent for patient with updated insurance info.

## 2022-04-06 NOTE — DISCHARGE PLANNING
Legal Hold Transfer     Referral: Legal hold transfer to Mental Health Facility     Intervention: Notified by SAM Slaughter that pt has been accepted to [facility].     Pt's accepting physcian is Dr. Crystal     Transport arranged through [facility]     The pt will be picked up at 1100      Notified Bedside RN Iris and Alert Team JEWEL Colón of the departure time as well as accepting facility.      Transfer packet created with original legal hold and placed on chart.      Plan: Pt will be transferring to [facility] today at 1100 via [facility].

## 2022-04-06 NOTE — DISCHARGE PLANNING
Alert Team Note:    Contacted [facility], spoke to Saturnino. Updated packet with verified insurance could not be located. Writer has sent a new packet with insurance verification as requested by Saturnino.   MALORIE

## 2022-04-06 NOTE — ED NOTES
1:1 sitter at door. Tele sitter d/c'd.  Pt restless and walking around room, on the phone with his wife. Denies any needs at this time.

## 2022-04-06 NOTE — ED NOTES
Onsite sitter has left and tele-sitter will now replace.  Spoke with Mony w/ tele-sitter room, confirmed system is on and actively monitored and in room. Pt awake resting comfortably.

## 2022-04-06 NOTE — ED NOTES
Pt report given to [facility], pt belongings, original Legal hold, and paperwork given to EMS. Pt ambulated w/o difficulty out of ER w/ EMS to transfer to [facility] via ambulance.

## 2022-04-06 NOTE — ED NOTES
Tele sitter in room.   Notified by alert team that transfer to [facility] is accepted. [facility] to transport at 1100.

## 2022-04-06 NOTE — ED NOTES
Pt refused zyprexa. Stated MD placed him on zyprexa in the past and did not do well on it. States it made him too sleepy to function at all. Explained use for zyprexa and dosing ordered. Pt states he wishes to speak to a psychiatrist face to face before starting zyprexa. ERP notified.

## 2022-04-06 NOTE — ED NOTES
Tele psych at bedside via ipad.   Pt is very calm, however is upset about uncertainty of discharge. Denies an SI/HI.   Wife has brought patients new insurance card with correct information.

## 2022-04-06 NOTE — ED PROVIDER NOTES
ED Provider Note    ED Observation Note:    Admitted to ED observation at 1443 on 4/4/22, continues to be on psychiatric hold     Family history negative for psychosis    ED Observation course:  Patient continues to require a sitter, he has been evaluated by psychiatry and has  case management working on placement  Repeat physical exam statement: Calm, sleeping, working with staff       Diagnosis problem list 1: Acute psychosis  and plan for Psychiatry following and managing psychiatric medications. Plan for transfer to a psychiatric facility when accepted.

## 2022-04-06 NOTE — CONSULTS
"  [facility] INPATIENT PSYCHIATRIC CONSULT LIAISON NOTE: INTAKE     DOS: 04/06/2022    Consulting Physician: Dr. Romario Espinoza.    REASON FOR CONSULT:  Legal Hold Evaluation    CC: “I need to transfer to another facility”     HPI:  From Chart Review “Patient is a 36 y/o male BIB EMS from PCP for inability to care for self d/t acute psychosis. Patient was seen at [facility] ER yesterday for similar presentation and discharged given Seroquel medication to address patient's altered mental status triggered by sleep deprivation and stress. Collateral information obtained from patient's wife reports exact manic type episodes occurred in 2008 then 2017 while patient and wife were on their honeymoon in [place]; patient was unable to sleep for 3-4 days d/t increasing stress. Patient was diagnosed with \"manic episodes\" and prescribed Seroquel and Benadryl back in 2017 until issue resolved not requiring continuation of medications. Wife reports probable triggers for current episode are the birth of a new baby in the past 6 months, the recent passing of an extended family member and the loss of patient's job in the past month. Patient has not slept since Thursday with increasingly erratic behavior, anxiety, labile mood and paranoia.”    Patient was seen this morning as a follow upconsult via Tele visit. Denies SI/HI/AVH.  Slept well last night. Patient's wife and his nurse were present at this visit. Patient's wife was very upset/frustrated because her  has not been transferred to a psych facility yet. There is probably an issue with the Insurance coverage/information. She is encouraged to present the most current insurance information to the  for update. Patient's nurse will work with  regarding his transfer to a psych facility.    ALLERGIES:       Allergies   Allergen Reactions   • Prednisone Rash   • Prednisone     • Risperidone Anxiety          PAST MEDICAL HISTORY:   Unremarkable   " "  PAST PSYCHIATRIC HISTORY:        Diagnosis Date   • Insomnia due to other mental disorder 11/13/2017   • Bipolar affect, depressed (HCC)             LEGAL HISTORY:  Denies     SOCIAL HISTORY:   Patient resides with wife and 2 children ages 3 and 6 months of age. Patient is presently unemployed; reports good social support           Socioeconomic History   • Marital status:    Tobacco Use   • Smoking status: Never Smoker   • Smokeless tobacco: Never Used   Vaping Use   • Vaping Use: Never used   Substance and Sexual Activity   • Alcohol use: Yes       Alcohol/week: 1.8 oz       Types: 3 Shots of liquor per week       Comment: occasionally    • Drug use: Not Currently       Frequency: 0.1 times per week       Types: Marijuana       Comment: recreationally   • Sexual activity: Yes       Partners: Female       Birth control/protection: Pill       Comment: , 2 young children   Social History Narrative     ** Merged History Encounter **             CURRENT HOSPITAL PROBLEMS:       NONE     PSYCHIATRIC EXAMNIATION:  VITALS: WNL     MENTAL STATUS EXAM:  Appearance: Dressed in hospital gown, normal grooming and hygiene, no acute distress.   Attitude: Calm and cooperative.  Behavior: Sitting up in bed. Negative for psychomotor agitation  Musculoskeletal: Unremarkable  Eye Contact: Adequate.  Speech:  coherent, adequate volume  Affect: Normal  Mood: \"good\"  Process: Goal-directed, organized. Linear  Content: Negative for Suicidal and Homicidal ideations.  Perception: Negative for auditory and visual hallucinations  Orientation: Oriented to Time, Place, Person and Self.  Memory: No significant deficits elicited  Insight: Fair     LABS: Reviewed     CURRENT PSYCHIATRIC MEDICATIONS:     OLANZapine zydis (ZYPREXA TBDP) disintegrating tablet 5 mg 5 mg, PO, Q EVENING   QUEtiapine (Seroquel) tablet 25 mg 25 mg, PO, Nightly          ASSESSMENT:    Bipolar Disorder  Major Depressive Disorder recurrent with anxious " distress    PLAN:  Legal Hold: Continue Hold    Recommend:  -Medication reconciliation was completed.  -No medication changes needed at this time  -Psych CL will continue following patient while at [facility].  -Reviewed safety plan - 911, ER, PCM, MHC, Suicide Crisis Line, Nursing staff while    inpatient.  -Visitors: Yes  -Personal Belongings: Yes  -Observation Level: 1:1 line of site  -Instruction: Please Transfer to inpatient psych when medically cleared.

## 2022-04-06 NOTE — ED NOTES
1:1 sitter at door not required, per psychiatry note, ok to have tele sitter. Tele sitter in place, monitor tech called and confirmed video monitoring is active.   Wife at bedside.

## 2022-04-06 NOTE — ED NOTES
1:1 sitter at door. POC updates given to patient. Agreeable at this time. No other needs now. Given ice water. Pt walking around room.

## 2022-04-07 ENCOUNTER — TELEPHONE (OUTPATIENT)
Dept: MEDICAL GROUP | Facility: MEDICAL CENTER | Age: 37
End: 2022-04-07

## 2022-04-07 NOTE — TELEPHONE ENCOUNTER
1. [facility]      Call Back Number: [phone] (home)         How would the patient prefer to be contacted with a response: Phone call OK to leave a detailed message    Pts wife Barbara called in [place] saying she needed Dr DYSON to call her back.No other info left.    Thank you

## 2023-05-25 NOTE — CONSULTS
"[facility]   TRIAGE ASSESSMENT    Name: Michael Burdick  MRN: [id number]  : 1985  Age: 37 y.o.  Date of assessment: 2022  PCP: Richmond Adler P.A.-C.  Persons in attendance: Patient (telehealth consultation via secure and encrypted videoconferencing technology)  Patient Location: [facility]    CHIEF COMPLAINT/PRESENTING ISSUE (as stated by Patient, Spouse-Barbara Burdick [phone], ER RN, ERP):   Chief Complaint   Patient presents with   • Anxiety     Severe Seen here yesterday for same  Pt totally out of control Hyperventilating Screaming Literally running around triage dragging his wife  Security called CN aware  Unable to de escalate pt      Patient is a 38 y/o male BIB EMS from PCP for inability to care for self d/t acute psychosis. Patient was seen at [facility] ER yesterday for similar presentation and discharged given Seroquel medication to address patient's altered mental status triggered by sleep depravation and stress. Collateral information obtained from patient's wife reports exact manic type episodes occurred in  then  while patient and wife were on their honeymoon in [place]; patient was unable to sleep for 3-4 days d/t increasing stress. Patient was diagnosed with \"manic episodes\" and prescribed Seroquel and Benadryl back in  until issue resolved not requiring continuation of medications. Wife reports probable triggers for current episode are the birth of a new baby in the past 6 months, the recent passing of an extended family member and the loss of patient's job in the past month. Patient has not slept since Thursday with increasingly erratic behavior, anxiety, labile mood and paranoia. Patient had endorsed vague thoughts of suicide to PCP prior to ER arrival without intent or plan. Patient denies SI at present and denies any hx of SAs or SH behaviors which is confirmed by wife. Presented to ER highly acute where patient was " i agree with this  ----- Message -----  From: Emani Washburn R.N.  Sent: 5/24/2023   1:31 PM PDT  To: Nancy Fuentes R.N.      Quality Review for SOC OASIS by KAVYA Washburn, RN on  May 24, 2023     Edits completed by KAVYA Washburn RN:  1.  and  diagnosis coding updated per chart review.  2. Per chart review,  is 3,  is 3 per assessment  3.  is 5/17/23 per LSOC order  4.  is NA, the 5/11 hospi talization was only an ED visit  5.  is less often than daily per neurology  6.  is 3 per respiratory assessment  7. Per narrative that patient needs moderate assistance and a walker for safety, the following changes were made:  and  are 2;  is 3  8. Safety measures checked ambulate only with assistance  9. Nutritional requirements, changed to diabetic diet  10.  is 13 per care plan therapy sets. placed on legal hold accordingly. Patient would greatly benefit further psychiatric evaluation, stabilization and treatment at this time.    CURRENT LIVING SITUATION/SOCIAL SUPPORT/FINANCIAL RESOURCES: Patient resides with wife and 2 children ages 3 and 6 months of age. Patient is presently unemployed; reports good social support.     BEHAVIORAL HEALTH/SUBSTANCE USE TREATMENT HISTORY  Does patient/parent report a history of prior behavioral health/substance use treatment for patient?   No: denies any IP psychiatric hospitalizations or formal PMH treatment. Patient is not on any medications.     SAFETY ASSESSMENT - SELF  Does patient acknowledge current or past symptoms of dangerousness to self or is previous history noted? no  Does parent/significant other report patient has current or past symptoms of dangerousness to self? yes  Does presenting problem suggest symptoms of dangerousness to self? Patient currently denies suicidal thoughts and is unable to recall endorsing suicidal thoughts documented in PCP visit prior to ER arrival. Patient placed on legal hold for inability to care for self d/t altered level of consciousness which increases patient's risk for possible harm to self.     SAFETY ASSESSMENT - OTHERS  Does patient acknowledge current or past symptoms of aggressive behavior or risk to others or is previous history noted? no  Does parent/significant other report patient has current or past symptoms of aggressive behavior or risk to others?  no  Does presenting problem suggest symptoms of dangerousness to others? Patient present to ER with significant altered level of consciousness exhibiting erratic and aggressive behaviors alongside paranoia thought content. Patient provided with medication and is presently calm and cooperative.     LEGAL HISTORY  Does patient acknowledge history of arrest/prison/FPC or is previous history noted? no    Crisis Safety Plan completed and copy given to patient?  "N\A    ABUSE/NEGLECT SCREENING  Does patient report feeling “unsafe” in his/her home, or afraid of anyone?  no  Does patient report any history of physical, sexual, or emotional abuse?  no  Does parent or significant other report any of the above? no  Is there evidence of neglect by self?  no  Is there evidence of neglect by a caregiver? no  Does the patient/parent report any history of CPS/APS/police involvement related to suspected abuse/neglect or domestic violence? no  Based on the information provided during the current assessment, is a mandated report of suspected abuse/neglect being made?  No    SUBSTANCE USE SCREENING  Yes:  Artur all substances used in the past 30 days: Frequent marijuana use.       Last Use Amount   []   Alcohol     []   Marijuana     []   Heroin     []   Prescription Opioids  (used without prescription, for    recreation, or in excess of prescribed amount)     []   Other Prescription  (used without prescription, for    recreation, or in excess of prescribed amount)     []   Cocaine      []   Methamphetamine     []   \"\" drugs (ectasy, MDMA)     []   Other substances        UDS results: THC  Breathalyzer results: 0.00    What consequences does the patient associate with any of the above substance use and or addictive behaviors? None    Risk factors for detox (check all that apply):  []  Seizures   []  Diaphoretic (sweating)   []  Tremors   []  Hallucinations   []  Increased blood pressure   []  Decreased blood pressure   []  Other   [x]  None      [x] Patient education on risk factors for detoxification and instructed to return to ER as needed.      MENTAL STATUS   Participation: Limited verbal participation  Grooming: Casual  Orientation: Alert and Fully Oriented at present; disorganized and not oriented upon ER arrival   Behavior: Calm; agitated and aggressive upon arrival  Eye contact: Good  Mood: Anxious  Affect: Constricted; labile upon ER arrival  Thought process: Logical; " loose association, tangential upon ER arrival   Thought content: Paranoia  Speech: Rate within normal limits and Volume within normal limits  Perception: Within normal limits  Memory:  Recent:  Poor  Insight: Poor  Judgment:  Poor  Other:    Collateral information:   Source:  [] Significant other present in person:   [x] Significant other by telephone: Barbara Burdick [phone]  [] [facility]   [x] [facility]  [x] [facility]  [x] Other: ERP    [] Unable to complete full assessment due to:  [] Acute intoxication  [] Patient declined to participate/engage  [] Patient verbally unresponsive  [] Significant cognitive deficits  [] Significant perceptual distortions or behavioral disorganization  [x] Other: N/A     CLINICAL IMPRESSIONS:  Primary:  ALOC, Marjan  Secondary:  Sleep Depravation, Hx of Manic Episodes       IDENTIFIED NEEDS/PLAN:  [Trigger DISPOSITION list for any items marked]    []  Imminent safety risk - self [] Imminent safety risk - others   []  Acute substance withdrawal [x]  Psychosis/Impaired reality testing   [x]  Mood/anxiety []  Substance use/Addictive behavior   [x]  Maladaptive behaviro []  Parent/child conflict   []  Family/Couples conflict []  Biomedical   []  Housing [x]  Financial   []   Legal  Occupational/Educational   []  Domestic violence []  Other:     Recommended Plan of Care:  Actively being addressed by Legal [facility], [facility], [facility] and [facility] and 1:1 Observation  *Telesitter may not be utilized for moderate or high risk patients    Has the Recommended Plan of Care/Level of Observation been reviewed with the patient's assigned nurse? yes    Does patient/parent or guardian express agreement with the above plan? yes    If a pediatric/adolescent patient, have out of town/out of state inpatient MH tx options been reviewed with parent/legal guardian with verbal consent given for referrals to be sent?  no    Referral appointment(s) scheduled? N\A    Alert team only: L2K for inability to care for self d/t acute psychosis. Patient will require further psychiatric evaluation, stabilization and treatment at this time.   I have discussed findings and recommendations with Dr. Donahue who is in agreement with these recommendations.     Referral information sent to the following outpatient community providers : [facility]    Referral information sent to the following inpatient community providers : [facility], [facility]        Mag [place] R.N.  4/4/2022